# Patient Record
Sex: MALE | Race: WHITE | NOT HISPANIC OR LATINO | Employment: FULL TIME | URBAN - METROPOLITAN AREA
[De-identification: names, ages, dates, MRNs, and addresses within clinical notes are randomized per-mention and may not be internally consistent; named-entity substitution may affect disease eponyms.]

---

## 2017-09-14 ENCOUNTER — ALLIED HEALTH/NURSE VISIT (OUTPATIENT)
Dept: FAMILY MEDICINE | Facility: CLINIC | Age: 47
End: 2017-09-14
Payer: COMMERCIAL

## 2017-09-14 DIAGNOSIS — T63.441A: Primary | ICD-10-CM

## 2017-09-14 PROCEDURE — 99207 ZZC NO CHARGE NURSE ONLY: CPT

## 2017-09-14 NOTE — MR AVS SNAPSHOT
"              After Visit Summary   9/14/2017    Cyrus Sanford    MRN: [id number]           Patient Information     Date Of Birth          1970        Visit Information        Provider Department      9/14/2017 11:00 AM NL RN TEAM A, [facility]        Today's Diagnoses     Sting, bee    -  1       Follow-ups after your visit        Your next 10 appointments already scheduled     Sep 15, 2017  9:00 AM CDT   SHORT with SUDHA Lo CNP   [facility]    [address] [facility] [address] [phone]              Who to contact     If you have questions or need follow up information about today's clinic visit or your schedule please contact [facility] directly at [phone].  Normal or non-critical lab and imaging results will be communicated to you by MyChart, letter or phone within 4 business days after the clinic has received the results. If you do not hear from us within 7 days, please contact the clinic through MyChart or phone. If you have a critical or abnormal lab result, we will notify you by phone as soon as possible.  Submit refill requests through Sentilla or call your pharmacy and they will forward the refill request to us. Please allow 3 business days for your refill to be completed.          Additional Information About Your Visit        MyChart Information     Sentilla lets you send messages to your doctor, view your test results, renew your prescriptions, schedule appointments and more. To sign up, go to [email or web address] . Click on \"Log in\" on the left side of the screen, which will take you to the Welcome page. Then click on \"Sign up Now\" on the right side of the page.     You will be asked to enter the access code listed below, as well as some personal information. Please follow the directions to create your username and password.     Your access code is: [email or web address]  Expires: 12/13/2017 11:42 " AM     Your access code will  in 90 days. If you need help or a new code, please call your [place] clinic or [phone].        Care EveryWhere ID     This is your Care EveryWhere ID. This could be used by other organizations to access your [place] medical records  [id number]         Blood Pressure from Last 3 Encounters:   16 (!) 139/100   16 137/80   04/18/15 128/90    Weight from Last 3 Encounters:   16 195 lb (88.5 kg)   03/26/15 198 lb (89.8 kg)   02/05/15 199 lb (90.3 kg)              Today, you had the following     No orders found for display       Primary Care Provider Office Phone # Fax #    Nicole Hankins PA-C [phone] [address] [place] [address]        Equal Access to Services     [facility]: Hadii aad ku hadasho Soomaali, waaxda luqadaha, qaybta kaalmada adeegyada, waxay abyin hayaan franchesca baldwin . So [facility] [phone].    ATENCIÓN: Si habla español, tiene a fofana disposición servicios gratuitos de asistencia lingüística. Llame al [phone].    We comply with applicable federal civil rights laws and [place] laws. We do not discriminate on the basis of race, color, national origin, age, disability sex, sexual orientation or gender identity.            Thank you!     Thank you for choosing [facility]  for your care. Our goal is always to provide you with excellent care. Hearing back from our patients is one way we can continue to improve our services. Please take a few minutes to complete the written survey that you may receive in the mail after your visit with us. Thank you!             Your Updated Medication List - Protect others around you: Learn how to safely use, store and throw away your medicines at www.disposemymeds.org.          This list is accurate as of: 17 11:42 AM.  Always use your most recent med list.                   Brand Name Dispense Instructions for use Diagnosis     acetaminophen-codeine 300-30 MG per tablet    TYLENOL w/CODEINE No. 3    10 tablet    Take 1 tablet by mouth every 6 hours as needed for mild pain    Influenza A       benzonatate 100 MG capsule    TESSALON    16 capsule    Take 1 capsule (100 mg) by mouth 3 times daily as needed for cough    Influenza A       cetirizine 10 MG tablet    zyrTEC     Take 10 mg by mouth daily        ibuprofen 600 MG tablet    ADVIL/MOTRIN    30 tablet    Take 1 tablet (600 mg) by mouth every 6 hours as needed for moderate pain    Influenza A       oseltamivir 75 MG capsule    TAMIFLU    10 capsule    Take 1 capsule (75 mg) by mouth 2 times daily    Influenza A

## 2017-09-14 NOTE — NURSING NOTE
Patient is here as a walk-in patient for a bee sting next to his right eye 2 days ago.  He has swelling for the past 2 days and has been icing the area, but it is not getting better.  He thinks the stinger is still in the skin which is causing the continual swelling.  He tried to take it out by using a piece of tape, but it just ripped the skin off, so now there is redness in addition to the swelling.  [facility], but they sent him to the clinic.      He is given an ice pack.  RN walked around the clinic asking if anyone would be able to see patient.  All providers are behind and cannot see him today.  He is informed he can either go to the ED today, or be scheduled in [place] tomorrow with TRISHA Do as she was contacted and stated he can be seen in any acute spot.  He is scheduled tomorrow in [place].  Closing this encounter.  Lisa Banks RN

## 2017-09-15 ENCOUNTER — OFFICE VISIT (OUTPATIENT)
Dept: FAMILY MEDICINE | Facility: OTHER | Age: 47
End: 2017-09-15
Payer: COMMERCIAL

## 2017-09-15 VITALS
RESPIRATION RATE: 20 BRPM | OXYGEN SATURATION: 99 % | HEIGHT: 77 IN | WEIGHT: 191 LBS | DIASTOLIC BLOOD PRESSURE: 88 MMHG | TEMPERATURE: 97 F | SYSTOLIC BLOOD PRESSURE: 136 MMHG | BODY MASS INDEX: 22.55 KG/M2 | HEART RATE: 85 BPM

## 2017-09-15 DIAGNOSIS — T63.441A BEE STING REACTION, ACCIDENTAL OR UNINTENTIONAL, INITIAL ENCOUNTER: Primary | ICD-10-CM

## 2017-09-15 DIAGNOSIS — R03.0 ELEVATED BLOOD PRESSURE READING WITHOUT DIAGNOSIS OF HYPERTENSION: ICD-10-CM

## 2017-09-15 PROCEDURE — 99213 OFFICE O/P EST LOW 20 MIN: CPT | Performed by: NURSE PRACTITIONER

## 2017-09-15 ASSESSMENT — PAIN SCALES - GENERAL: PAINLEVEL: NO PAIN (0)

## 2017-09-15 NOTE — MR AVS SNAPSHOT
"              After Visit Summary   9/15/2017    Cyrus Sanford    MRN: [id number]           Patient Information     Date Of Birth          1970        Visit Information        Provider Department      9/15/2017 9:00 AM Yakelin Do APRN CNP [facility]        Care Instructions    Keep an eye on your blood pressure at home.  It should be less than 140/90.     Take Benadryl if needed.     If this starts to look infected or is draining pus, let me know.               Follow-ups after your visit        Who to contact     If you have questions or need follow up information about today's clinic visit or your schedule please contact [facility] directly at [phone].  Normal or non-critical lab and imaging results will be communicated to you by Paxatahart, letter or phone within 4 business days after the clinic has received the results. If you do not hear from us within 7 days, please contact the clinic through MyChart or phone. If you have a critical or abnormal lab result, we will notify you by phone as soon as possible.  Submit refill requests through orderTalk or call your pharmacy and they will forward the refill request to us. Please allow 3 business days for your refill to be completed.          Additional Information About Your Visit        MyChart Information     orderTalk lets you send messages to your doctor, view your test results, renew your prescriptions, schedule appointments and more. To sign up, go to [email or web address] . Click on \"Log in\" on the left side of the screen, which will take you to the Welcome page. Then click on \"Sign up Now\" on the right side of the page.     You will be asked to enter the access code listed below, as well as some personal information. Please follow the directions to create your username and password.     Your access code is: [email or web address]  Expires: 2017 11:42 AM     Your access code will  in 90 days. If you need help or a new " "code, please call your [place] clinic or [phone].        Care EveryWhere ID     This is your Care EveryWhere ID. This could be used by other organizations to access your [place] medical records  [id number]        Your Vitals Were     Pulse Temperature Respirations Height Pulse Oximetry BMI (Body Mass Index)    85 97  F (36.1  C) (Tympanic) 20 6' 4.5\" (1.943 m) 99% 22.95 kg/m2       Blood Pressure from Last 3 Encounters:   09/15/17 (!) 138/96   03/09/16 (!) 139/100   03/04/16 137/80    Weight from Last 3 Encounters:   09/15/17 191 lb (86.6 kg)   03/09/16 195 lb (88.5 kg)   03/26/15 198 lb (89.8 kg)              Today, you had the following     No orders found for display       Primary Care Provider Office Phone # Fax #    Nicole Hankins PA-C [phone]       [address] [place] [address]        Equal Access to Services     [facility]: Hadii aad ku hadasho Soomaali, waaxda luqadaha, qaybta kaalmada adeegyada, waxay chong haydora baldwin . So [facility] [phone].    ATENCIÓN: Si habla español, tiene a fofana disposición servicios gratuitos de asistencia lingüística. Llame al [phone].    We comply with applicable federal civil rights laws and [place] laws. We do not discriminate on the basis of race, color, national origin, age, disability sex, sexual orientation or gender identity.            Thank you!     Thank you for choosing [facility]  for your care. Our goal is always to provide you with excellent care. Hearing back from our patients is one way we can continue to improve our services. Please take a few minutes to complete the written survey that you may receive in the mail after your visit with us. Thank you!             Your Updated Medication List - Protect others around you: Learn how to safely use, store and throw away your medicines at www.disposemymeds.org.      Notice  As of 9/15/2017  9:25 AM    You have not been prescribed any medications. "

## 2017-09-15 NOTE — NURSING NOTE
"Chief Complaint   Patient presents with     Insect Bites     9/12/17 by right eye       Initial BP (!) 138/96  Pulse 85  Temp 97  F (36.1  C) (Tympanic)  Resp 20  Ht 6' 4.5\" (1.943 m)  Wt 191 lb (86.6 kg)  SpO2 99%  BMI 22.95 kg/m2 Estimated body mass index is 22.95 kg/(m^2) as calculated from the following:    Height as of this encounter: 6' 4.5\" (1.943 m).    Weight as of this encounter: 191 lb (86.6 kg).  Medication Reconciliation: complete   ................Lauri Dow LPN,   September 15, 2017,      9:17 AM,   [facility]    "

## 2017-09-15 NOTE — PATIENT INSTRUCTIONS
Keep an eye on your blood pressure at home.  It should be less than 140/90.     Take Benadryl if needed.     If this starts to look infected or is draining pus, let me know.

## 2017-09-15 NOTE — PROGRESS NOTES
"  SUBJECTIVE:   Cyrus Sanford is a 47 year old male who presents to clinic today for the following health issues:      BEE STING  -doi: 9/12/17  -next to right eye  -swelling  -stinger possibly still in skin  -used ice, benadryl, ibuprofen      Problem list and histories reviewed & adjusted, as indicated.  Additional history: as documented    Patient Active Problem List   Diagnosis     Tenosynovitis of finger     CARDIOVASCULAR SCREENING; LDL GOAL LESS THAN 130     Hypertension goal BP (blood pressure) < 140/90     Past Surgical History:   Procedure Laterality Date     HC VASECTOMY UNILAT/BILAT W POSTOP SEMEN  2000    Vasectomy       Social History   Substance Use Topics     Smoking status: Former Smoker     Smokeless tobacco: Current User     Types: Chew      Comment: 1 tin every 3 days     Alcohol use Yes      Comment: occ     Family History   Problem Relation Age of Onset     Respiratory Father      Asthma     CANCER Sister          No current outpatient prescriptions on file.     Allergies   Allergen Reactions     No Known Allergies      BP Readings from Last 3 Encounters:   09/15/17 (!) 138/96   03/09/16 (!) 139/100   03/04/16 137/80    Wt Readings from Last 3 Encounters:   09/15/17 191 lb (86.6 kg)   03/09/16 195 lb (88.5 kg)   03/26/15 198 lb (89.8 kg)                        Reviewed and updated as needed this visit by clinical staffTobacco  Allergies  Meds  Soc Hx      Reviewed and updated as needed this visit by Provider         ROS:  C: NEGATIVE for fever, chills, change in weight  EYES: NEGATIVE for vision changes or irritation, skin irritation from bee sting as above   E/M: NEGATIVE for ear, mouth and throat problems  R: NEGATIVE for significant cough or SOB  CV: NEGATIVE for chest pain, palpitations or peripheral edema    OBJECTIVE:     /88  Pulse 85  Temp 97  F (36.1  C) (Tympanic)  Resp 20  Ht 6' 4.5\" (1.943 m)  Wt 191 lb (86.6 kg)  SpO2 99%  BMI 22.95 kg/m2  Body mass index is 22.95 " kg/(m^2).  GENERAL: healthy, alert and no distress  EYES: Eyes grossly normal to inspection, PERRL and conjunctivae and sclerae normal  HENT: nose and mouth without ulcers or lesions, oropharynx clear and he has skin erythema near his right eye.  There is a an area of darkness in the center that he was questioning if this is the bee stinger.  It is normal to palpation and I cannot express any foreign objects out of this. I suspect is it the incision where the bee stung him, but the stinger does not appear to still be in place.  There is no swelling, no drainage and his eye is not affected.      Diagnostic Test Results:  none     ASSESSMENT/PLAN:         1. Bee sting reaction, accidental or unintentional, initial encounter  Advised he monitor this, take Benadryl as needed.  If signs of infection develop, contact the clinic.     2. Elevated blood pressure reading without diagnosis of hypertension  He has been monitoring this at home.  Advised he continue to do so and that if it is consistently above 140/90, he needs clinic follow up.       See Patient Instructions  Follow up SUDHA Lawson CNP  [facility]

## 2018-07-23 ENCOUNTER — APPOINTMENT (OUTPATIENT)
Dept: GENERAL RADIOLOGY | Facility: CLINIC | Age: 48
End: 2018-07-23
Attending: NURSE PRACTITIONER
Payer: COMMERCIAL

## 2018-07-23 ENCOUNTER — HOSPITAL ENCOUNTER (EMERGENCY)
Facility: CLINIC | Age: 48
Discharge: HOME | End: 2018-07-23
Attending: NURSE PRACTITIONER | Admitting: NURSE PRACTITIONER
Payer: COMMERCIAL

## 2018-07-23 VITALS
WEIGHT: 200 LBS | HEART RATE: 75 BPM | BODY MASS INDEX: 24.03 KG/M2 | RESPIRATION RATE: 16 BRPM | TEMPERATURE: 98.3 F | SYSTOLIC BLOOD PRESSURE: 149 MMHG | OXYGEN SATURATION: 100 % | DIASTOLIC BLOOD PRESSURE: 105 MMHG

## 2018-07-23 DIAGNOSIS — S20.212A CONTUSION OF RIB ON LEFT SIDE, INITIAL ENCOUNTER: ICD-10-CM

## 2018-07-23 DIAGNOSIS — S40.021A CONTUSION OF RIGHT UPPER EXTREMITY, INITIAL ENCOUNTER: ICD-10-CM

## 2018-07-23 PROCEDURE — 25000132 ZZH RX MED GY IP 250 OP 250 PS 637: Performed by: NURSE PRACTITIONER

## 2018-07-23 PROCEDURE — 99284 EMERGENCY DEPT VISIT MOD MDM: CPT | Mod: Z6 | Performed by: NURSE PRACTITIONER

## 2018-07-23 PROCEDURE — 71101 X-RAY EXAM UNILAT RIBS/CHEST: CPT | Mod: TC,LT

## 2018-07-23 PROCEDURE — 99283 EMERGENCY DEPT VISIT LOW MDM: CPT | Performed by: NURSE PRACTITIONER

## 2018-07-23 RX ORDER — IBUPROFEN 600 MG/1
600 TABLET, FILM COATED ORAL ONCE
Status: COMPLETED | OUTPATIENT
Start: 2018-07-23 | End: 2018-07-23

## 2018-07-23 RX ADMIN — IBUPROFEN 600 MG: 600 TABLET ORAL at 17:18

## 2018-07-23 ASSESSMENT — ENCOUNTER SYMPTOMS: COUGH: 1

## 2018-07-23 NOTE — ED AVS SNAPSHOT
[facility]    [address] [place] [address]    Phone:  [phone]    Fax:  [phone]                                       Cyrus Sanford   MRN: [id number]    Department:  [facility]   Date of Visit:  7/23/2018           After Visit Summary Signature Page     I have received my discharge instructions, and my questions have been answered. I have discussed any challenges I see with this plan with the nurse or doctor.    ..........................................................................................................................................  Patient/Patient Representative Signature      ..........................................................................................................................................  Patient Representative Print Name and Relationship to Patient    ..................................................               ................................................  Date                                            Time    ..........................................................................................................................................  Reviewed by Signature/Title    ...................................................              ..............................................  Date                                                            Time

## 2018-07-23 NOTE — ED AVS SNAPSHOT
[facility]    [address] [place] [address]    Phone:  [phone]    Fax:  [phone]                                       Cyrus Sanford   MRN: [id number]    Department:  [facility]   Date of Visit:  7/23/2018           Patient Information     Date Of Birth          1970        Your diagnoses for this visit were:     Contusion of rib on left side, initial encounter     Contusion of right upper extremity, initial encounter        You were seen by Jerri Ron, SUDHA CNP.      Follow-up Information     Follow up with Nicole Hankins PA-C.    Specialty:  Family Practice    Why:  As needed    Contact information:    [address] [place] [address] [phone]          Follow up with [facility].    Specialty:  EMERGENCY MEDICINE    Why:  If symptoms worsen    Contact information:    [address] [place] [address] [phone]    Additional information:    From [address]: Exit at [address] on south side of [place]. Turn right on [address]. Turn left at stoplight on [address]. [facility] will be in view two blocks ahead        Discharge Instructions         Using an Incentive Spirometer    An incentive spirometer is a device that helps you do deep breathing exercises. These exercises expand your lungs, aid in circulation, and help prevent pneumonia. Deep breathing exercises also help you breathe better and improve the function of your lungs by:    Keeping your lungs clear    Strengthening your breathing muscles    Helping prevent respiratory complications or problems  The incentive spirometer gives you a way to take an active part in your recovery. A nurse or therapist will teach you breathing exercises. To do these exercises, you will breathe in through your mouth and not your nose. The incentive spirometer only works correctly if you breathe in  through your mouth.  Steps to clear lungs  Step 1. Exhale normally. Then, inhale normally.    Relax and breathe out.  Step 2. Place your lips tightly around the mouthpiece.    Make sure the device is upright and not tilted.  Step 3. Inhale as much air as you can through the mouthpiece (don't breathe through your nose).    Inhale slowly and deeply.    Hold your breath long enough to keep the balls or disk raised for at least 3 to 5 seconds, or as instructed by your healthcare provider.    Some spirometers have an indicator to let you know that you are breathing in too fast. If the indicator goes off, breathe in more slowly.  Step 4. Repeat the exercise regularly.    Do this exercise every hour while you're awake, or as instructed by your healthcare provider.    If you were taught deep breathing and coughing exercises, do them regularly as instructed by your healthcare provider.   Follow-up care  Make a follow up appointment, or as directed. Also, follow up with your healthcare provider as advised or if your symptoms don't improve or continue to get worse.  When to call your healthcare provider  Call your healthcare provider right away if you have any of the following:    Fever 100.4  (38 C) or higher, or as directed by your healthcare provider    Brownish, bloody, or smelly sputum (phlegm that you cough up)  Call 911  Call 911 if any of these occur:     Shortness of breath that doesn't get better after taking your medicine    Cool, moist, pale or blue skin    Trouble breathing or swallowing, wheezing    Fainting or loss of consciousness    Feeling of dizziness or weakness, or a sudden drop in blood pressure    Feeling very ill    Lightheadedness    Chest pain or rapid heart rate  Date Last Reviewed: 1/1/2017 2000-2017 [employer]. [address] [place] [address], [place] [address]. All rights reserved. This information is not intended as a substitute for professional medical care. Always follow your  healthcare professional's instructions.        Rib Contusion     A rib contusion is a bruise to one or more rib bones. It may cause pain, tenderness, swelling and a purplish discoloration. There may be a sharp pain while breathing.  You will be assessed for other injuries. You will likely be given pain medicine. Rib contusions heal on their own, without further treatment. However, pain may take weeks to months to go away.   Note that a small crack (fracture) in the rib may cause the same symptoms as a rib contusion. The small crack may not be seen on a chest X-ray. However, the conditions are managed in the same way.  Home care    Rest. Avoid heavy lifting, strenuous exertion, or any activity that causes pain.    Ice the area to reduce pain and swelling. Put ice cubes in a plastic bag or use a cold pack. (Wrap the cold source in a thin towel. Do not place it directly on your skin.) Ice the injured area for 20 minutes every 1 to 2 hours the first day. Continue with ice packs 3 to 4 times a day for the next 2 days, then as needed for the relief of pain and swelling.    Take any prescribed pain medicine as directed by your healthcare provider. If none was prescribed, take acetaminophen, ibuprofen, or naproxen to control pain.    If you have a significant injury, you may be given a device called an incentive spirometer to keep your lungs healthy. Use as directed.  Follow-up care  Follow up with your healthcare provider during the next week or as directed.  When to seek medical advice  Call your healthcare provider for any of the following:    Shortness of breath or trouble breathing    Increasing chest pain with breathing    Coughing    Dizziness, weakness, or fainting    New or worsening pain    Fever of 100.4 F (38 C) or higher, or as directed by your healthcare provider  Date Last Reviewed: 2/1/2017 2000-2017 [employer]. [address] [place] [address], [place] [address]. All rights reserved. This  "information is not intended as a substitute for professional medical care. Always follow your healthcare professional's instructions.      Cyrus, you can take 600 mg of Ibuprofen every 6 hours for the next few days.  Take care and I hope you feel better soon.     24 Hour Appointment Hotline       To make an appointment at any [facility], call [phone] ([phone]). If you don't have a family doctor or clinic, we will help you find one. [facility] are conveniently located to serve the needs of you and your family.             Review of your medicines      Notice     You have not been prescribed any medications.            Procedures and tests performed during your visit     Ribs XR, unilat 3 views + PA chest,  left      Orders Needing Specimen Collection     None      Pending Results     No orders found from 7/21/2018 to 7/24/2018.            Pending Culture Results     No orders found from 7/21/2018 to 7/24/2018.            Pending Results Instructions     If you had any lab results that were not finalized at the time of your Discharge, you can call the ED Lab Result RN at [phone]. You will be contacted by this team for any positive Lab results or changes in treatment. The nurses are available 7 days a week from 10A to 6:30P.  You can leave a message 24 hours per day and they will return your call.        Thank you for choosing [facility]       Thank you for choosing [facility] for your care. Our goal is always to provide you with excellent care. Hearing back from our patients is one way we can continue to improve our services. Please take a few minutes to complete the written survey that you may receive in the mail after you visit with us. Thank you!        [employer] Information     [employer] lets you send messages to your doctor, view your test results, renew your prescriptions, schedule appointments and more. To sign up, go to [email or web address] . Click on \"Log in\" on the left side of the " "screen, which will take you to the Welcome page. Then click on \"Sign up Now\" on the right side of the page.     You will be asked to enter the access code listed below, as well as some personal information. Please follow the directions to create your username and password.     Your access code is: [email or web address]  Expires: 10/21/2018  6:10 PM     Your access code will  in 90 days. If you need help or a new code, please call your [place] clinic or [phone].        Care EveryWhere ID     This is your Care EveryWhere ID. This could be used by other organizations to access your [place] medical records  [id number]        Equal Access to Services     ALEJA ANGEL : Matthias Wilson, raghavendra hairston, cornelio emerson, devin marquez. So [facility] [phone].    ATENCIÓN: Si habla español, tiene a fofana disposición servicios gratuitos de asistencia lingüística. Llame al [phone].    We comply with applicable federal civil rights laws and [place] laws. We do not discriminate on the basis of race, color, national origin, age, disability, sex, sexual orientation, or gender identity.            After Visit Summary       This is your record. Keep this with you and show to your community pharmacist(s) and doctor(s) at your next visit.                  "
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
## 2018-07-23 NOTE — LETTER
July 23, 2018      To Whom It May Concern:      Cyrus Sanford was seen in our Emergency Department today, 07/23/18.  Please  Excuse him form work until Thursday.    Thank you      Sincerely,        SUDHA Carrera CNP
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
## 2018-07-23 NOTE — DISCHARGE INSTRUCTIONS
Using an Incentive Spirometer    An incentive spirometer is a device that helps you do deep breathing exercises. These exercises expand your lungs, aid in circulation, and help prevent pneumonia. Deep breathing exercises also help you breathe better and improve the function of your lungs by:    Keeping your lungs clear    Strengthening your breathing muscles    Helping prevent respiratory complications or problems  The incentive spirometer gives you a way to take an active part in your recovery. A nurse or therapist will teach you breathing exercises. To do these exercises, you will breathe in through your mouth and not your nose. The incentive spirometer only works correctly if you breathe in through your mouth.  Steps to clear lungs  Step 1. Exhale normally. Then, inhale normally.    Relax and breathe out.  Step 2. Place your lips tightly around the mouthpiece.    Make sure the device is upright and not tilted.  Step 3. Inhale as much air as you can through the mouthpiece (don't breathe through your nose).    Inhale slowly and deeply.    Hold your breath long enough to keep the balls or disk raised for at least 3 to 5 seconds, or as instructed by your healthcare provider.    Some spirometers have an indicator to let you know that you are breathing in too fast. If the indicator goes off, breathe in more slowly.  Step 4. Repeat the exercise regularly.    Do this exercise every hour while you're awake, or as instructed by your healthcare provider.    If you were taught deep breathing and coughing exercises, do them regularly as instructed by your healthcare provider.   Follow-up care  Make a follow up appointment, or as directed. Also, follow up with your healthcare provider as advised or if your symptoms don't improve or continue to get worse.  When to call your healthcare provider  Call your healthcare provider right away if you have any of the following:    Fever 100.4  (38 C) or higher, or as directed by your  healthcare provider    Brownish, bloody, or smelly sputum (phlegm that you cough up)  Call 911  Call 911 if any of these occur:     Shortness of breath that doesn't get better after taking your medicine    Cool, moist, pale or blue skin    Trouble breathing or swallowing, wheezing    Fainting or loss of consciousness    Feeling of dizziness or weakness, or a sudden drop in blood pressure    Feeling very ill    Lightheadedness    Chest pain or rapid heart rate  Date Last Reviewed: 1/1/2017 2000-2017 [employer]. [address] [place] [address]. All rights reserved. This information is not intended as a substitute for professional medical care. Always follow your healthcare professional's instructions.        Rib Contusion     A rib contusion is a bruise to one or more rib bones. It may cause pain, tenderness, swelling and a purplish discoloration. There may be a sharp pain while breathing.  You will be assessed for other injuries. You will likely be given pain medicine. Rib contusions heal on their own, without further treatment. However, pain may take weeks to months to go away.   Note that a small crack (fracture) in the rib may cause the same symptoms as a rib contusion. The small crack may not be seen on a chest X-ray. However, the conditions are managed in the same way.  Home care    Rest. Avoid heavy lifting, strenuous exertion, or any activity that causes pain.    Ice the area to reduce pain and swelling. Put ice cubes in a plastic bag or use a cold pack. (Wrap the cold source in a thin towel. Do not place it directly on your skin.) Ice the injured area for 20 minutes every 1 to 2 hours the first day. Continue with ice packs 3 to 4 times a day for the next 2 days, then as needed for the relief of pain and swelling.    Take any prescribed pain medicine as directed by your healthcare provider. If none was prescribed, take acetaminophen, ibuprofen, or naproxen to control pain.    If you  have a significant injury, you may be given a device called an incentive spirometer to keep your lungs healthy. Use as directed.  Follow-up care  Follow up with your healthcare provider during the next week or as directed.  When to seek medical advice  Call your healthcare provider for any of the following:    Shortness of breath or trouble breathing    Increasing chest pain with breathing    Coughing    Dizziness, weakness, or fainting    New or worsening pain    Fever of 100.4 F (38 C) or higher, or as directed by your healthcare provider  Date Last Reviewed: 2/1/2017 2000-2017 [employer]. [address] [place] [address]. All rights reserved. This information is not intended as a substitute for professional medical care. Always follow your healthcare professional's instructions.      Cyrus, you can take 600 mg of Ibuprofen every 6 hours for the next few days.  Take care and I hope you feel better soon.

## 2018-11-05 ENCOUNTER — OFFICE VISIT (OUTPATIENT)
Dept: FAMILY MEDICINE | Facility: CLINIC | Age: 48
End: 2018-11-05
Payer: COMMERCIAL

## 2018-11-05 VITALS
TEMPERATURE: 97.9 F | DIASTOLIC BLOOD PRESSURE: 86 MMHG | WEIGHT: 198.9 LBS | HEIGHT: 77 IN | SYSTOLIC BLOOD PRESSURE: 152 MMHG | OXYGEN SATURATION: 99 % | HEART RATE: 105 BPM | RESPIRATION RATE: 18 BRPM | BODY MASS INDEX: 23.49 KG/M2

## 2018-11-05 DIAGNOSIS — Z13.6 CARDIOVASCULAR SCREENING; LDL GOAL LESS THAN 130: ICD-10-CM

## 2018-11-05 DIAGNOSIS — Z12.11 COLON CANCER SCREENING: ICD-10-CM

## 2018-11-05 DIAGNOSIS — E78.5 HYPERLIPIDEMIA LDL GOAL <130: ICD-10-CM

## 2018-11-05 DIAGNOSIS — I10 HYPERTENSION GOAL BP (BLOOD PRESSURE) < 140/90: ICD-10-CM

## 2018-11-05 DIAGNOSIS — Z12.5 SCREENING FOR PROSTATE CANCER: ICD-10-CM

## 2018-11-05 DIAGNOSIS — R68.82 DECREASED LIBIDO: ICD-10-CM

## 2018-11-05 DIAGNOSIS — Z00.01 ENCOUNTER FOR GENERAL ADULT MEDICAL EXAMINATION WITH ABNORMAL FINDINGS: Primary | ICD-10-CM

## 2018-11-05 PROCEDURE — 99396 PREV VISIT EST AGE 40-64: CPT | Performed by: FAMILY MEDICINE

## 2018-11-05 PROCEDURE — 99213 OFFICE O/P EST LOW 20 MIN: CPT | Mod: 25 | Performed by: FAMILY MEDICINE

## 2018-11-05 RX ORDER — AMLODIPINE BESYLATE 5 MG/1
5 TABLET ORAL DAILY
Qty: 30 TABLET | Refills: 1 | Status: SHIPPED | OUTPATIENT
Start: 2018-11-05 | End: 2019-01-03

## 2018-11-05 ASSESSMENT — ENCOUNTER SYMPTOMS
COUGH: 0
FREQUENCY: 1
HEARTBURN: 0
HEMATURIA: 0
EYE PAIN: 0
NAUSEA: 0
HEADACHES: 0
PARESTHESIAS: 0
JOINT SWELLING: 0
WEAKNESS: 0
PALPITATIONS: 0
DYSURIA: 0
FEVER: 0
SHORTNESS OF BREATH: 0
CHILLS: 0
HEMATOCHEZIA: 0
ABDOMINAL PAIN: 0
ARTHRALGIAS: 0
MYALGIAS: 1
CONSTIPATION: 0
DIARRHEA: 0
NERVOUS/ANXIOUS: 0
SORE THROAT: 0
DIZZINESS: 0

## 2018-11-05 ASSESSMENT — PAIN SCALES - GENERAL: PAINLEVEL: NO PAIN (0)

## 2018-11-05 NOTE — PATIENT INSTRUCTIONS
Preventive Health Recommendations  Male Ages 50 - 64    Yearly exam:             See your health care provider every year in order to  o   Review health changes.   o   Discuss preventive care.    o   Review your medicines if your doctor has prescribed any.     Have a cholesterol test every 5 years, or more frequently if you are at risk for high cholesterol/heart disease.     Have a diabetes test (fasting glucose) every three years. If you are at risk for diabetes, you should have this test more often.     Have a colonoscopy at age 50, or have a yearly FIT test (stool test). These exams will check for colon cancer.      Talk with your health care provider about whether or not a prostate cancer screening test (PSA) is right for you.    You should be tested each year for STDs (sexually transmitted diseases), if you re at risk.     Shots: Get a flu shot each year. Get a tetanus shot every 10 years.     Nutrition:    Eat at least 5 servings of fruits and vegetables daily.     Eat whole-grain bread, whole-wheat pasta and brown rice instead of white grains and rice.     Get adequate Calcium and Vitamin D.     Lifestyle    Exercise for at least 150 minutes a week (30 minutes a day, 5 days a week). This will help you control your weight and prevent disease.     Limit alcohol to one drink per day.     No smoking.     Wear sunscreen to prevent skin cancer.     See your dentist every six months for an exam and cleaning.     See your eye doctor every 1 to 2 years.  Start amlodipine 5 mg a day for blood pressure

## 2018-11-05 NOTE — PROGRESS NOTES
SUBJECTIVE:   CC: Cyrus Sanford is an 48 year old male who presents for preventative health visit.     Physical   Annual:     Getting at least 3 servings of Calcium per day:  Yes    Bi-annual eye exam:  Yes    Dental care twice a year:  NO    Sleep apnea or symptoms of sleep apnea:  None    Diet:  Regular (no restrictions)    Frequency of exercise:  1 day/week    Duration of exercise:  45-60 minutes    Taking medications regularly:  Yes    Medication side effects:  Not applicable    Additional concerns today:  Yes        Complains of decreased libido.    Today's PHQ-2 Score:   PHQ-2 ( 1999 [employer]) 11/5/2018   Q1: Little interest or pleasure in doing things 0   Q2: Feeling down, depressed or hopeless 0   PHQ-2 Score 0   Q1: Little interest or pleasure in doing things Not at all   Q2: Feeling down, depressed or hopeless Not at all   PHQ-2 Score 0       Abuse: Current or Past(Physical, Sexual or Emotional)- No  Do you feel safe in your environment - Yes    Social History   Substance Use Topics     Smoking status: Former Smoker     Smokeless tobacco: Current User     Types: Chew      Comment: 1 tin every 3 days     Alcohol use Yes      Comment: occ     Alcohol Use 11/5/2018   If you drink alcohol do you typically have greater than 3 drinks per day OR greater than 7 drinks per week? Yes       Last PSA: No results found for: PSA    Reviewed orders with patient. Reviewed health maintenance and updated orders accordingly - Yes      Reviewed and updated as needed this visit by clinical staff  Tobacco  Allergies  Meds         Reviewed and updated as needed this visit by Provider        Past Medical History:   Diagnosis Date     Unspecified asthma(493.90)     Asthma      Past Surgical History:   Procedure Laterality Date     HC VASECTOMY UNILAT/BILAT W POSTOP SEMEN  2000    Vasectomy       Review of Systems   Constitutional: Negative for chills and fever.   HENT: Positive for hearing loss. Negative for congestion, ear pain  "and sore throat.    Eyes: Positive for visual disturbance. Negative for pain.   Respiratory: Negative for cough and shortness of breath.    Cardiovascular: Negative for chest pain, palpitations and peripheral edema.   Gastrointestinal: Negative for abdominal pain, constipation, diarrhea, heartburn, hematochezia and nausea.   Genitourinary: Positive for frequency and urgency. Negative for discharge, dysuria, genital sores, hematuria and impotence.   Musculoskeletal: Positive for myalgias. Negative for arthralgias and joint swelling.   Skin: Negative for rash.   Neurological: Negative for dizziness, weakness, headaches and paresthesias.   Psychiatric/Behavioral: Negative for mood changes. The patient is not nervous/anxious.          OBJECTIVE:   /86  Pulse 105  Temp 97.9  F (36.6  C) (Temporal)  Resp 18  Ht 6' 5.25\" (1.962 m)  Wt 198 lb 14.4 oz (90.2 kg)  SpO2 99%  BMI 23.43 kg/m2    Physical Exam  GENERAL: healthy, alert and no distress  EYES: Eyes grossly normal to inspection, PERRL and conjunctivae and sclerae normal  HENT: ear canals and TM's normal, nose and mouth without ulcers or lesions  NECK: no adenopathy, no asymmetry, masses, or scars and thyroid normal to palpation  RESP: lungs clear to auscultation - no rales, rhonchi or wheezes  CV: regular rate and rhythm, normal S1 S2, no S3 or S4, no murmur, click or rub, no peripheral edema and peripheral pulses strong  ABDOMEN: soft, nontender, no hepatosplenomegaly, no masses and bowel sounds normal  MS: no gross musculoskeletal defects noted, no edema  SKIN: no suspicious lesions or rashes  NEURO: Normal strength and tone, mentation intact and speech normal  PSYCH: mentation appears normal, affect normal/bright    Diagnostic Test Results:  none     ASSESSMENT/PLAN:   #1 encounter for general medical examination with abnormal findings    #2 hypertension newly discovered we will start amlodipine 5 mg a day follow-up in a month.      #3 hyperlipidemia   " "he has cholesterol is very high we will notify him by phone and recommend starting Lipitor 40 mg a day and rechecking in 6-8 weeks.    #4 decreased libido we will get testosterone level on him and notify with results.  Also a PSA as he has decreased urine flow.    COUNSELING:   Reviewed preventive health counseling, as reflected in patient instructions       Regular exercise       Healthy diet/nutrition    BP Readings from Last 1 Encounters:   11/05/18 152/86     Estimated body mass index is 23.43 kg/(m^2) as calculated from the following:    Height as of this encounter: 6' 5.25\" (1.962 m).    Weight as of this encounter: 198 lb 14.4 oz (90.2 kg).           reports that he has quit smoking. His smokeless tobacco use includes Chew.  Tobacco Cessation Action Plan: Self help information given to patient    Counseling Resources:  ATP IV Guidelines  Pooled Cohorts Equation Calculator  FRAX Risk Assessment  ICSI Preventive Guidelines  Dietary Guidelines for Americans, 2010  USDA's MyPlate  ASA Prophylaxis  Lung CA Screening    Richard Barroso MD  [facility]  Answers for HPI/ROS submitted by the patient on 11/5/2018   PHQ-2 Score: 0    "

## 2018-11-05 NOTE — MR AVS SNAPSHOT
After Visit Summary   11/5/2018    Cyrus Sanford    MRN: [id number]           Patient Information     Date Of Birth          1970        Visit Information        Provider Department      11/5/2018 11:00 AM Richard Barroso MD [facility]        Today's Diagnoses     Encounter for general adult medical examination with abnormal findings    -  1    Hypertension goal BP (blood pressure) < 140/90        Colon cancer screening        Decreased libido        CARDIOVASCULAR SCREENING; LDL GOAL LESS THAN 130        Screening for prostate cancer          Care Instructions      Preventive Health Recommendations  Male Ages 50 - 64    Yearly exam:             See your health care provider every year in order to  o   Review health changes.   o   Discuss preventive care.    o   Review your medicines if your doctor has prescribed any.     Have a cholesterol test every 5 years, or more frequently if you are at risk for high cholesterol/heart disease.     Have a diabetes test (fasting glucose) every three years. If you are at risk for diabetes, you should have this test more often.     Have a colonoscopy at age 50, or have a yearly FIT test (stool test). These exams will check for colon cancer.      Talk with your health care provider about whether or not a prostate cancer screening test (PSA) is right for you.    You should be tested each year for STDs (sexually transmitted diseases), if you re at risk.     Shots: Get a flu shot each year. Get a tetanus shot every 10 years.     Nutrition:    Eat at least 5 servings of fruits and vegetables daily.     Eat whole-grain bread, whole-wheat pasta and brown rice instead of white grains and rice.     Get adequate Calcium and Vitamin D.     Lifestyle    Exercise for at least 150 minutes a week (30 minutes a day, 5 days a week). This will help you control your weight and prevent disease.     Limit alcohol to one drink per day.     No smoking.     Wear  sunscreen to prevent skin cancer.     See your dentist every six months for an exam and cleaning.     See your eye doctor every 1 to 2 years.  Start amlodipine 5 mg a day for blood pressure          Follow-ups after your visit        Additional Services     GASTROENTEROLOGY ADULT REF PROCEDURE ONLY [facility] [phone]       Last Lab Result: Creatinine (mg/dL)       Date                     Value                 03/09/2016               1.25             ----------  Body mass index is 23.43 kg/(m^2).     Needed:  No  Language:  English    Patient will be contacted to schedule procedure.     Please be aware that coverage of these services is subject to the terms and limitations of your health insurance plan.  Call member services at your health plan with any benefit or coverage questions.  Any procedures must be performed at a [facility] facility OR coordinated by your clinic's referral office.    Please bring the following with you to your appointment:    (1) Any X-Rays, CTs or MRIs which have been performed.  Contact the facility where they were done to arrange for  prior to your scheduled appointment.    (2) List of current medications   (3) This referral request   (4) Any documents/labs given to you for this referral                  Follow-up notes from your care team     Call patient with results Return in about 4 weeks (around 12/3/2018) for BP Recheck.      Future tests that were ordered for you today     Open Future Orders        Priority Expected Expires Ordered    Lipid panel reflex to direct LDL Fasting Routine 1/4/2019 3/5/2019 11/5/2018    **Comprehensive metabolic panel FUTURE 2mo Routine 1/4/2019 3/5/2019 11/5/2018    **CBC with platelets FUTURE 2mo Routine 1/4/2019 3/5/2019 11/5/2018    **Testosterone Free and Total FUTURE anytime Routine 11/5/2018 11/5/2019 11/5/2018    Prostate spec antigen screen Routine  11/5/2019 11/5/2018            Who to contact     If you have  "questions or need follow up information about today's clinic visit or your schedule please contact [facility] directly at [phone].  Normal or non-critical lab and imaging results will be communicated to you by MyChart, letter or phone within 4 business days after the clinic has received the results. If you do not hear from us within 7 days, please contact the clinic through MyChart or phone. If you have a critical or abnormal lab result, we will notify you by phone as soon as possible.  Submit refill requests through [employer] or call your pharmacy and they will forward the refill request to us. Please allow 3 business days for your refill to be completed.          Additional Information About Your Visit        Care EveryWhere ID     This is your Care EveryWhere ID. This could be used by other organizations to access your [place] medical records  [id number]        Your Vitals Were     Pulse Temperature Respirations Height Pulse Oximetry BMI (Body Mass Index)    105 97.9  F (36.6  C) (Temporal) 18 6' 5.25\" (1.962 m) 99% 23.43 kg/m2       Blood Pressure from Last 3 Encounters:   11/05/18 152/86   07/23/18 (!) 149/105   09/15/17 136/88    Weight from Last 3 Encounters:   11/05/18 198 lb 14.4 oz (90.2 kg)   07/23/18 200 lb (90.7 kg)   09/15/17 191 lb (86.6 kg)              We Performed the Following     GASTROENTEROLOGY ADULT REF PROCEDURE ONLY [facility] [phone]          Today's Medication Changes          These changes are accurate as of 11/5/18 11:50 AM.  If you have any questions, ask your nurse or doctor.               Start taking these medicines.        Dose/Directions    amLODIPine 5 MG tablet   Commonly known as:  NORVASC   Used for:  Hypertension goal BP (blood pressure) < 140/90   Started by:  Richard Barroso MD        Dose:  5 mg   Take 1 tablet (5 mg) by mouth daily   Quantity:  30 tablet   Refills:  1            Where to get your medicines      These medications were " sent to [facility] [address] [place], [place] - [address]  [address], [place] [facility] [address]     Phone:  [phone]     amLODIPine 5 MG tablet                Primary Care Provider Fax #    Physician No Ref-Primary [phone]       No address on file        Equal Access to Services     BRANDIEGUERDA JEANNE : Hadii aad ku hadasho Soomaali, waaxda luqadaha, qaybta kaalmada adeegyada, waxay abyin hayirman adetyrell jovanamerlin larebecca marquez. So [facility] [phone].    ATENCIÓN: Si habla español, tiene a fofana disposición servicios gratuitos de asistencia lingüística. Lissetteame al [phone].    We comply with applicable federal civil rights laws and [place] laws. We do not discriminate on the basis of race, color, national origin, age, disability, sex, sexual orientation, or gender identity.            Thank you!     Thank you for choosing [facility]  for your care. Our goal is always to provide you with excellent care. Hearing back from our patients is one way we can continue to improve our services. Please take a few minutes to complete the written survey that you may receive in the mail after your visit with us. Thank you!             Your Updated Medication List - Protect others around you: Learn how to safely use, store and throw away your medicines at www.disposemymeds.org.          This list is accurate as of 11/5/18 11:50 AM.  Always use your most recent med list.                   Brand Name Dispense Instructions for use Diagnosis    amLODIPine 5 MG tablet    NORVASC    30 tablet    Take 1 tablet (5 mg) by mouth daily    Hypertension goal BP (blood pressure) < 140/90       IBUPROFEN PO      4 tabs daily

## 2018-11-06 ENCOUNTER — TELEPHONE (OUTPATIENT)
Dept: FAMILY MEDICINE | Facility: CLINIC | Age: 48
End: 2018-11-06

## 2018-11-06 NOTE — LETTER
[address] [address]  [phone]        November 6, 2018    Cyrus Sanford  [address] [address] [place] [id number]

## 2018-11-06 NOTE — LETTER

## 2018-11-06 NOTE — TELEPHONE ENCOUNTER
Date of colonoscopy/EGD: 12/10/18  Surgeon: Dr. Montgomery  Prep:Miralax  Packet:Colonoscopy/EGD instructions mailed to patient's home address.   Date: 11/6/2018      Surgery Scheduler

## 2018-11-07 DIAGNOSIS — Z13.6 CARDIOVASCULAR SCREENING; LDL GOAL LESS THAN 130: ICD-10-CM

## 2018-11-07 DIAGNOSIS — R68.82 DECREASED LIBIDO: ICD-10-CM

## 2018-11-07 DIAGNOSIS — I10 HYPERTENSION GOAL BP (BLOOD PRESSURE) < 140/90: ICD-10-CM

## 2018-11-07 DIAGNOSIS — Z12.5 SCREENING FOR PROSTATE CANCER: ICD-10-CM

## 2018-11-07 LAB
ALBUMIN SERPL-MCNC: 3.8 G/DL (ref 3.4–5)
ALP SERPL-CCNC: 64 U/L (ref 40–150)
ALT SERPL W P-5'-P-CCNC: 32 U/L (ref 0–70)
ANION GAP SERPL CALCULATED.3IONS-SCNC: 9 MMOL/L (ref 3–14)
AST SERPL W P-5'-P-CCNC: 18 U/L (ref 0–45)
BILIRUB SERPL-MCNC: 0.9 MG/DL (ref 0.2–1.3)
BUN SERPL-MCNC: 16 MG/DL (ref 7–30)
CALCIUM SERPL-MCNC: 9 MG/DL (ref 8.5–10.1)
CHLORIDE SERPL-SCNC: 105 MMOL/L (ref 94–109)
CHOLEST SERPL-MCNC: 251 MG/DL
CO2 SERPL-SCNC: 27 MMOL/L (ref 20–32)
CREAT SERPL-MCNC: 1.19 MG/DL (ref 0.66–1.25)
ERYTHROCYTE [DISTWIDTH] IN BLOOD BY AUTOMATED COUNT: 13.7 % (ref 10–15)
GFR SERPL CREATININE-BSD FRML MDRD: 65 ML/MIN/1.7M2
GLUCOSE SERPL-MCNC: 101 MG/DL (ref 70–99)
HCT VFR BLD AUTO: 47.9 % (ref 40–53)
HDLC SERPL-MCNC: 72 MG/DL
HGB BLD-MCNC: 15.5 G/DL (ref 13.3–17.7)
LDLC SERPL CALC-MCNC: 158 MG/DL
MCH RBC QN AUTO: 28 PG (ref 26.5–33)
MCHC RBC AUTO-ENTMCNC: 32.4 G/DL (ref 31.5–36.5)
MCV RBC AUTO: 87 FL (ref 78–100)
NONHDLC SERPL-MCNC: 179 MG/DL
PLATELET # BLD AUTO: 252 10E9/L (ref 150–450)
POTASSIUM SERPL-SCNC: 4 MMOL/L (ref 3.4–5.3)
PROT SERPL-MCNC: 7.6 G/DL (ref 6.8–8.8)
PSA SERPL-ACNC: 0.76 UG/L (ref 0–4)
RBC # BLD AUTO: 5.53 10E12/L (ref 4.4–5.9)
SODIUM SERPL-SCNC: 141 MMOL/L (ref 133–144)
TRIGL SERPL-MCNC: 106 MG/DL
WBC # BLD AUTO: 5.1 10E9/L (ref 4–11)

## 2018-11-07 PROCEDURE — G0103 PSA SCREENING: HCPCS | Performed by: FAMILY MEDICINE

## 2018-11-07 PROCEDURE — 84270 ASSAY OF SEX HORMONE GLOBUL: CPT | Performed by: FAMILY MEDICINE

## 2018-11-07 PROCEDURE — 84403 ASSAY OF TOTAL TESTOSTERONE: CPT | Performed by: FAMILY MEDICINE

## 2018-11-07 PROCEDURE — 85027 COMPLETE CBC AUTOMATED: CPT | Performed by: FAMILY MEDICINE

## 2018-11-07 PROCEDURE — 36415 COLL VENOUS BLD VENIPUNCTURE: CPT | Performed by: FAMILY MEDICINE

## 2018-11-07 PROCEDURE — 80061 LIPID PANEL: CPT | Performed by: FAMILY MEDICINE

## 2018-11-07 PROCEDURE — 80053 COMPREHEN METABOLIC PANEL: CPT | Performed by: FAMILY MEDICINE

## 2018-11-09 LAB
SHBG SERPL-SCNC: 25 NMOL/L (ref 11–80)
TESTOST FREE SERPL-MCNC: 8.88 NG/DL (ref 4.7–24.4)
TESTOST SERPL-MCNC: 378 NG/DL (ref 240–950)

## 2018-11-13 DIAGNOSIS — Z13.6 CARDIOVASCULAR SCREENING; LDL GOAL LESS THAN 130: ICD-10-CM

## 2018-11-13 DIAGNOSIS — I10 HYPERTENSION GOAL BP (BLOOD PRESSURE) < 140/90: Primary | ICD-10-CM

## 2018-11-13 NOTE — PROGRESS NOTES
Labs from last week show your testosterone level was in the normal range.  Your cholesterol is very high at 251 with a bad type of 158.  Chemistry panel including blood sugar was normal and your PSA was normal blood count was normal.  It would be recommended that you start a lipid-lowering medication I would recommend Lipitor 40 mg a day and we will recheck blood work in 6-8 weeks with a lipid panel AST ALT and CK.  We can arrange this when we see you back to check her blood pressure.

## 2018-11-13 NOTE — TELEPHONE ENCOUNTER
----- Message from Richard Barroso MD sent at 11/13/2018 12:23 PM CST -----  Labs from last week show your testosterone level was in the normal range.  Your cholesterol is very high at 251 with a bad type of 158.  Chemistry panel including blood sugar was normal and your PSA was normal blood count was normal.  It would be recommended that you start a lipid-lowering medication I would recommend Lipitor 40 mg a day and we will recheck blood work in 6-8 weeks with a lipid panel AST ALT and CK.  We can arrange this when we see you back to check her blood pressure.

## 2018-11-13 NOTE — TELEPHONE ENCOUNTER
Left message to call back. Please relay results to pt when calls back. Also find out which pharmacy he uses for the medication and assist in scheduling an appt with Dr. Barroso.

## 2018-11-13 NOTE — TELEPHONE ENCOUNTER
Patient calling back, gave message below, patient understands the message. Patient uses the [email or web address] in [place]. Also scheduled patient for a follow up with his provider.

## 2018-11-14 RX ORDER — ATORVASTATIN CALCIUM 40 MG/1
40 TABLET, FILM COATED ORAL DAILY
Qty: 90 TABLET | Refills: 1 | Status: SHIPPED | OUTPATIENT
Start: 2018-11-14 | End: 2019-05-28

## 2018-12-09 ENCOUNTER — ANESTHESIA EVENT (OUTPATIENT)
Dept: GASTROENTEROLOGY | Facility: CLINIC | Age: 48
End: 2018-12-09
Payer: COMMERCIAL

## 2018-12-09 ASSESSMENT — LIFESTYLE VARIABLES: TOBACCO_USE: 1

## 2018-12-10 ENCOUNTER — SURGERY (OUTPATIENT)
Age: 48
End: 2018-12-10
Payer: COMMERCIAL

## 2018-12-10 ENCOUNTER — ANESTHESIA (OUTPATIENT)
Dept: GASTROENTEROLOGY | Facility: CLINIC | Age: 48
End: 2018-12-10
Payer: COMMERCIAL

## 2018-12-10 ENCOUNTER — HOSPITAL ENCOUNTER (OUTPATIENT)
Facility: CLINIC | Age: 48
Discharge: HOME | End: 2018-12-10
Attending: FAMILY MEDICINE | Admitting: FAMILY MEDICINE
Payer: COMMERCIAL

## 2018-12-10 VITALS
TEMPERATURE: 97.5 F | RESPIRATION RATE: 16 BRPM | OXYGEN SATURATION: 100 % | DIASTOLIC BLOOD PRESSURE: 105 MMHG | SYSTOLIC BLOOD PRESSURE: 135 MMHG

## 2018-12-10 LAB — COLONOSCOPY: NORMAL

## 2018-12-10 PROCEDURE — 37000008 ZZH ANESTHESIA TECHNICAL FEE, 1ST 30 MIN: Performed by: FAMILY MEDICINE

## 2018-12-10 PROCEDURE — 40000299 ZZH STATISTIC ENDO RECOVERY CLASS 1:3 EA ADD HOUR: Performed by: FAMILY MEDICINE

## 2018-12-10 PROCEDURE — G0121 COLON CA SCRN NOT HI RSK IND: HCPCS | Performed by: FAMILY MEDICINE

## 2018-12-10 PROCEDURE — 25000128 H RX IP 250 OP 636: Performed by: NURSE ANESTHETIST, CERTIFIED REGISTERED

## 2018-12-10 PROCEDURE — 45378 DIAGNOSTIC COLONOSCOPY: CPT | Performed by: FAMILY MEDICINE

## 2018-12-10 PROCEDURE — 25000125 ZZHC RX 250: Performed by: NURSE ANESTHETIST, CERTIFIED REGISTERED

## 2018-12-10 PROCEDURE — 25000125 ZZHC RX 250: Performed by: FAMILY MEDICINE

## 2018-12-10 PROCEDURE — 40000296 ZZH STATISTIC ENDO RECOVERY CLASS 1:2 FIRST HOUR: Performed by: FAMILY MEDICINE

## 2018-12-10 PROCEDURE — 37000009 ZZH ANESTHESIA TECHNICAL FEE, EACH ADDTL 15 MIN: Performed by: FAMILY MEDICINE

## 2018-12-10 PROCEDURE — 25000128 H RX IP 250 OP 636: Performed by: FAMILY MEDICINE

## 2018-12-10 RX ORDER — LIDOCAINE 40 MG/G
CREAM TOPICAL
Status: DISCONTINUED | OUTPATIENT
Start: 2018-12-10 | End: 2018-12-10 | Stop reason: HOSPADM

## 2018-12-10 RX ORDER — PROPOFOL 10 MG/ML
INJECTION, EMULSION INTRAVENOUS PRN
Status: DISCONTINUED | OUTPATIENT
Start: 2018-12-10 | End: 2018-12-10

## 2018-12-10 RX ORDER — LIDOCAINE HYDROCHLORIDE 20 MG/ML
INJECTION, SOLUTION INFILTRATION; PERINEURAL PRN
Status: DISCONTINUED | OUTPATIENT
Start: 2018-12-10 | End: 2018-12-10

## 2018-12-10 RX ORDER — PROPOFOL 10 MG/ML
INJECTION, EMULSION INTRAVENOUS CONTINUOUS PRN
Status: DISCONTINUED | OUTPATIENT
Start: 2018-12-10 | End: 2018-12-10

## 2018-12-10 RX ORDER — ONDANSETRON 2 MG/ML
4 INJECTION INTRAMUSCULAR; INTRAVENOUS
Status: DISCONTINUED | OUTPATIENT
Start: 2018-12-10 | End: 2018-12-10 | Stop reason: HOSPADM

## 2018-12-10 RX ORDER — SODIUM CHLORIDE, SODIUM LACTATE, POTASSIUM CHLORIDE, CALCIUM CHLORIDE 600; 310; 30; 20 MG/100ML; MG/100ML; MG/100ML; MG/100ML
INJECTION, SOLUTION INTRAVENOUS CONTINUOUS
Status: DISCONTINUED | OUTPATIENT
Start: 2018-12-10 | End: 2018-12-10 | Stop reason: HOSPADM

## 2018-12-10 RX ADMIN — PROPOFOL 50 MG: 10 INJECTION, EMULSION INTRAVENOUS at 12:59

## 2018-12-10 RX ADMIN — PROPOFOL 150 MCG/KG/MIN: 10 INJECTION, EMULSION INTRAVENOUS at 12:59

## 2018-12-10 RX ADMIN — LIDOCAINE HYDROCHLORIDE 1 ML: 10 INJECTION, SOLUTION EPIDURAL; INFILTRATION; INTRACAUDAL at 11:16

## 2018-12-10 RX ADMIN — SODIUM CHLORIDE, POTASSIUM CHLORIDE, SODIUM LACTATE AND CALCIUM CHLORIDE: 600; 310; 30; 20 INJECTION, SOLUTION INTRAVENOUS at 11:36

## 2018-12-10 RX ADMIN — PROPOFOL 50 MG: 10 INJECTION, EMULSION INTRAVENOUS at 12:58

## 2018-12-10 RX ADMIN — LIDOCAINE HYDROCHLORIDE 40 MG: 20 INJECTION, SOLUTION INFILTRATION; PERINEURAL at 12:59

## 2018-12-10 RX ADMIN — PROPOFOL 50 MG: 10 INJECTION, EMULSION INTRAVENOUS at 13:00

## 2018-12-10 NOTE — ANESTHESIA PREPROCEDURE EVALUATION
Anesthesia Pre-Procedure Evaluation    Patient: Cyrus Sanford   MRN: [id number] : 1970          Preoperative Diagnosis: screening    Procedure(s):  COLONOSCOPY    Past Medical History:   Diagnosis Date     Unspecified asthma(493.90)     Asthma     Past Surgical History:   Procedure Laterality Date     HC VASECTOMY UNILAT/BILAT W POSTOP SEMEN  2000    Vasectomy       Anesthesia Evaluation     . Pt has had prior anesthetic. Type: MAC           ROS/MED HX    ENT/Pulmonary: Comment: Current smokeless tobacco user    (+)tobacco use, Past use asthma , . .    Neurologic:  - neg neurologic ROS     Cardiovascular:     (+) hypertension-range: 140/90, ---. : . . . :. .       METS/Exercise Tolerance:  >4 METS   Hematologic:  - neg hematologic  ROS       Musculoskeletal:  - neg musculoskeletal ROS       GI/Hepatic:  - neg GI/hepatic ROS       Renal/Genitourinary:         Endo:  - neg endo ROS       Psychiatric:  - neg psychiatric ROS       Infectious Disease:  - neg infectious disease ROS       Malignancy:      - no malignancy   Other:    - neg other ROS                      Physical Exam  Normal systems: cardiovascular, pulmonary and dental    Airway   Mallampati: I  TM distance: >3 FB  Neck ROM: full    Dental     Cardiovascular   Rhythm and rate: regular and normal      Pulmonary    breath sounds clear to auscultation            Lab Results   Component Value Date    WBC 5.1 2018    HGB 15.5 2018    HCT 47.9 2018     2018     2018    POTASSIUM 4.0 2018    CHLORIDE 105 2018    CO2 27 2018    BUN 16 2018    CR 1.19 2018     (H) 2018    ELISE 9.0 2018    ALBUMIN 3.8 2018    PROTTOTAL 7.6 2018    ALT 32 2018    AST 18 2018    ALKPHOS 64 2018    BILITOTAL 0.9 2018    TSH 1.60 2015       Preop Vitals  BP Readings from Last 3 Encounters:   18 152/86   18 (!) 149/105   09/15/17 136/88  "   Pulse Readings from Last 3 Encounters:   11/05/18 105   07/23/18 75   09/15/17 85      Resp Readings from Last 3 Encounters:   11/05/18 18   07/23/18 16   09/15/17 20    SpO2 Readings from Last 3 Encounters:   11/05/18 99%   07/23/18 100%   09/15/17 99%      Temp Readings from Last 1 Encounters:   11/05/18 97.9  F (36.6  C) (Temporal)    Ht Readings from Last 1 Encounters:   11/05/18 1.962 m (6' 5.25\")      Wt Readings from Last 1 Encounters:   11/05/18 90.2 kg (198 lb 14.4 oz)    Estimated body mass index is 23.43 kg/m  as calculated from the following:    Height as of 11/5/18: 1.962 m (6' 5.25\").    Weight as of 11/5/18: 90.2 kg (198 lb 14.4 oz).       Anesthesia Plan      History & Physical Review  History and physical reviewed and following examination; no interval change.    ASA Status:  1 .    NPO Status:  > 4 hours    Plan for MAC with Intravenous and Propofol induction. Maintenance will be TIVA.  Reason for MAC:  Deep or markedly invasive procedure (G8)    Dr. Montgomery to do the H&P prior to administration of anesthesia.      Postoperative Care  Postoperative pain management:  IV analgesics.      Consents  Anesthetic plan, risks, benefits and alternatives discussed with:  Patient..                 SUDHA Bhagat CRNA  "

## 2018-12-10 NOTE — ANESTHESIA POSTPROCEDURE EVALUATION
Patient: Cyrus Sanford    Procedure(s):  COLONOSCOPY    Diagnosis:screening  Diagnosis Additional Information: No value filed.    Anesthesia Type:  MAC    Note:  Anesthesia Post Evaluation    Patient location during evaluation: Phase 2  Patient participation: Able to fully participate in evaluation  Level of consciousness: awake and alert  Pain management: adequate  Airway patency: patent  Cardiovascular status: acceptable  Respiratory status: acceptable  Hydration status: acceptable  PONV: none     Anesthetic complications: None          Last vitals:  Vitals:    12/10/18 1110 12/10/18 1324   BP: (!) 142/101 91/59   Resp: 16 16   Temp: 97.5  F (36.4  C)    SpO2: 100% 97%         Electronically Signed By: SUDHA Bhagat CRNA  December 10, 2018  1:35 PM

## 2018-12-10 NOTE — ANESTHESIA CARE TRANSFER NOTE
Patient: Cyrus Sanford    Procedure(s):  COLONOSCOPY    Diagnosis: screening  Diagnosis Additional Information: No value filed.    Anesthesia Type:   MAC     Note:  Airway :Face Mask  Patient transferred to:Phase II  Handoff Report: Identifed the Patient, Identified the Reponsible Provider, Reviewed the pertinent medical history, Discussed the surgical course, Reviewed Intra-OP anesthesia mangement and issues during anesthesia, Set expectations for post-procedure period and Allowed opportunity for questions and acknowledgement of understanding      Vitals: (Last set prior to Anesthesia Care Transfer)    CRNA VITALS  12/10/2018 1249 - 12/10/2018 1332      12/10/2018             Resp Rate (observed):  19                Electronically Signed By: SUDHA Bhagat CRNA  December 10, 2018  1:32 PM

## 2018-12-10 NOTE — H&P
"Pre-Endoscopy History and Physical     Cyrus Sanford MRN# [id number]   YOB: 1970 Age: 48 year old     Date of Procedure: 12/10/2018  Primary care provider: Richard Barroso  Type of Endoscopy: colonoscopy  Type of Anesthesia Anticipated: MAC     HPI:    Cyrus is a 48 year old male who will be undergoing the above procedure.      Cyrus is feeling well today. Can get up a single flight of stairs without dyspnea. Estimated METS > 4.     Patient Active Problem List   Diagnosis     Tenosynovitis of finger     CARDIOVASCULAR SCREENING; LDL GOAL LESS THAN 130     Hypertension goal BP (blood pressure) < 140/90        Medications Prior to Admission   Medication Sig Dispense Refill Last Dose     amLODIPine (NORVASC) 5 MG tablet Take 1 tablet (5 mg) by mouth daily 30 tablet 1 12/9/2018 at Unknown time     atorvastatin (LIPITOR) 40 MG tablet Take 1 tablet (40 mg) by mouth daily 90 tablet 1 12/9/2018 at Unknown time     IBUPROFEN PO 4 tabs daily    at Unknown time        REVIEW OF SYSTEMS:     5 point ROS negative except as noted above in HPI, including Gen., Resp., CV, GI &  system review.      PHYSICAL EXAM:   BP (!) 142/101   Temp 97.5  F (36.4  C) (Oral)   Resp 16   SpO2 100%    Estimated body mass index is 23.43 kg/m  as calculated from the following:    Height as of 11/5/18: 1.962 m (6' 5.25\").    Weight as of 11/5/18: 90.2 kg (198 lb 14.4 oz).    GENERAL APPEARANCE: alert and no distress  RESP: lungs clear and unlabored  CV: regular  ABD: soft, nt/nd    DIAGNOSTICS:    Not indicated      IMPRESSION   ASA Class 2 - Mild systemic disease        PLAN:     Plan for colonoscopy. No medical contraindications to proceed, or further work up needed. The risks and benefits of the procedure and the sedation options and risks were discussed with the patient. These include infection, bleeding, and small risk of colon perforation (1/1000 to 1/81007 depending on patient characteristics and type of procedure). Cyrus " was also explained to alternatives for colo-rectal screening. All questions were answered and informed consent was obtained.      The above has been forwarded to the consulting provider.      Signed Electronically by: Zoran Montgomery  December 10, 2018

## 2018-12-10 NOTE — DISCHARGE INSTRUCTIONS
[facility]    Home Care Following Endoscopy          Activity:    You have just undergone an endoscopic procedure usually performed with conscious sedation.  Do not work or operate machinery (including a car) for at least 12 hours.      I encourage you to walk and attempt to pass this air as soon as possible.    Diet:    Return to the diet you were on before your procedure but eat lightly for the first 12-24 hours.    Drink plenty of water.    Resume any regular medications unless otherwise advised by your physician.  Please begin any new medication prescribed as a result of your procedure as directed by your physician.     If you had any biopsy or polyp removed please refrain from aspirin or aspirin products for 2 days.  If on Coumadin please restart as instructed by your physician.   Pain:    You may take Tylenol as needed for pain.  Expected Recovery:    You can expect some mild abdominal fullness and/or discomfort due to the air used to inflate your intestinal tract. It is also normal to have a mild sore throat after upper endoscopy.    Call Your Physician if You Have:    After Upper Endoscopy:  o Shoulder, back or chest pain.  o Difficulty breathing or swallowing.  o Vomiting blood.    After Colonoscopy:  o Worsening persisting abdominal pain which is worse with activity.  o Fevers (>101 degrees F), chills or shakes.  o Passage of continued blood with bowel movements.   Any questions or concerns about your recovery, please call [phone] or after hours [phone] Nurse Advice Line.    Follow-up Care:    You should receive a call or letter with your results within 1 week. Please call if you have not received a notification of your results.  If asked to return to clinic please make an appointment 1 week after your procedure.  Call [phone].

## 2018-12-27 ENCOUNTER — TELEPHONE (OUTPATIENT)
Dept: FAMILY MEDICINE | Facility: CLINIC | Age: 48
End: 2018-12-27

## 2018-12-27 DIAGNOSIS — I10 HYPERTENSION GOAL BP (BLOOD PRESSURE) < 140/90: ICD-10-CM

## 2018-12-27 DIAGNOSIS — Z00.00 ROUTINE HISTORY AND PHYSICAL EXAMINATION OF ADULT: ICD-10-CM

## 2018-12-27 DIAGNOSIS — Z13.6 CARDIOVASCULAR SCREENING; LDL GOAL LESS THAN 130: Primary | ICD-10-CM

## 2018-12-27 NOTE — TELEPHONE ENCOUNTER
Reason for Call:  Other call back    Detailed comments: Pt has appt with Dharmesh on 1/3 for cholesterol check and wondering if you can add in a preop in the same time? He is having surgery on 1/31/19. Please call him and advise if you can do both on 1/3/19?     Phone Number Patient can be reached at: Home number on file [phone] (home)    Best Time: anytime    Can we leave a detailed message on this number? YES    Call taken on 12/27/2018 at 10:02 AM by Emerita Winkler

## 2018-12-27 NOTE — TELEPHONE ENCOUNTER
Called patient and informed labs are placed and needs to have labs done before appointment and will do pre op on that visit.  Leann Ariza MA

## 2018-12-28 DIAGNOSIS — Z13.6 CARDIOVASCULAR SCREENING; LDL GOAL LESS THAN 130: ICD-10-CM

## 2018-12-28 LAB
CHOLEST SERPL-MCNC: 186 MG/DL
HDLC SERPL-MCNC: 86 MG/DL
LDLC SERPL CALC-MCNC: 54 MG/DL
NONHDLC SERPL-MCNC: 100 MG/DL
TRIGL SERPL-MCNC: 230 MG/DL

## 2018-12-28 PROCEDURE — 36415 COLL VENOUS BLD VENIPUNCTURE: CPT | Performed by: FAMILY MEDICINE

## 2018-12-28 PROCEDURE — 80061 LIPID PANEL: CPT | Performed by: FAMILY MEDICINE

## 2018-12-28 NOTE — LETTER
January 14, 2019      Cyrus Sanford  [address] [facility] [address]        Dear ,    We are writing to inform you of your test results.    Labs from 2 weeks ago showed your cholesterol is at 186.  Triglycerides were up a little bit at 230.  Continue same medication     Resulted Orders   Lipid panel reflex to direct LDL Fasting   Result Value Ref Range    Cholesterol 186 <200 mg/dL    Triglycerides 230 (H) <150 mg/dL      Comment:      Borderline high:  150-199 mg/dl  High:             200-499 mg/dl  Very high:       >499 mg/dl  Fasting specimen      HDL Cholesterol 86 >39 mg/dL    LDL Cholesterol Calculated 54 <100 mg/dL      Comment:      Desirable:       <100 mg/dl    Non HDL Cholesterol 100 <130 mg/dL       If you have any questions or concerns, please call the clinic at the number listed above.       Sincerely,        Dr. Richard Barroso

## 2019-01-03 ENCOUNTER — OFFICE VISIT (OUTPATIENT)
Dept: FAMILY MEDICINE | Facility: CLINIC | Age: 49
End: 2019-01-03
Payer: COMMERCIAL

## 2019-01-03 VITALS
RESPIRATION RATE: 14 BRPM | SYSTOLIC BLOOD PRESSURE: 136 MMHG | BODY MASS INDEX: 23.13 KG/M2 | OXYGEN SATURATION: 100 % | HEART RATE: 86 BPM | DIASTOLIC BLOOD PRESSURE: 80 MMHG | HEIGHT: 78 IN | TEMPERATURE: 97.9 F | WEIGHT: 199.9 LBS

## 2019-01-03 DIAGNOSIS — Z01.818 PREOP GENERAL PHYSICAL EXAM: Primary | ICD-10-CM

## 2019-01-03 DIAGNOSIS — H26.9 CATARACT OF RIGHT EYE, UNSPECIFIED CATARACT TYPE: ICD-10-CM

## 2019-01-03 DIAGNOSIS — I10 HYPERTENSION GOAL BP (BLOOD PRESSURE) < 140/90: ICD-10-CM

## 2019-01-03 PROCEDURE — 99214 OFFICE O/P EST MOD 30 MIN: CPT | Performed by: FAMILY MEDICINE

## 2019-01-03 RX ORDER — AMLODIPINE BESYLATE 5 MG/1
5 TABLET ORAL DAILY
Qty: 90 TABLET | Refills: 1 | Status: SHIPPED | OUTPATIENT
Start: 2019-01-03 | End: 2019-07-22

## 2019-01-03 ASSESSMENT — MIFFLIN-ST. JEOR: SCORE: 1892.99

## 2019-01-03 NOTE — PROGRESS NOTES
[address] [address]  [phone]  Dept: [phone]    PRE-OP EVALUATION:  Today's date: 1/3/2019    Cyrus Sanford (: 1970) presents for pre-operative evaluation assessment as requested by Dr. Tavarez.  He requires evaluation and anesthesia risk assessment prior to undergoing surgery/procedure for treatment of Phacoemulsification with standard intraocular lens implant lens .    Fax number for surgical facility: [facility]  Primary Physician: Richard Barroso  Type of Anesthesia Anticipated: General    Patient has a Health Care Directive or Living Will:  NO    Preop Questions 1/3/2019   Who is doing your surgery? [facility]   What are you having done? cataract surgery   Date of Surgery/Procedure: 2019   Facility or Hospital where procedure/surgery will be performed: [facility]   1.  Do you have a history of Heart attack, stroke, stent, coronary bypass surgery, or other heart surgery? No   2.  Do you ever have any pain or discomfort in your chest? No   3.  Do you have a history of  Heart Failure? No   4.   Are you troubled by shortness of breath when:  walking on a level surface, or up a slight hill, or at night? No   5.  Do you currently have a cold, bronchitis or other respiratory infection? No   6.  Do you have a cough, shortness of breath, or wheezing? No   7.  Do you sometimes get pains in the calves of your legs when you walk? No   8. Do you or anyone in your family have previous history of blood clots? No   9.  Do you or does anyone in your family have a serious bleeding problem such as prolonged bleeding following surgeries or cuts? No   10. Have you ever had problems with anemia or been told to take iron pills? No   11. Have you had any abnormal blood loss such as black, tarry or bloody stools? No   12. Have you ever had a blood transfusion? No   13. Have you or any of your relatives ever had problems with anesthesia? No    14. Do you have sleep apnea, excessive snoring or daytime drowsiness? No   15. Do you have any prosthetic heart valves? No   16. Do you have prosthetic joints? No         HPI:     HPI related to upcoming procedure: Cataract in right eye          MEDICAL HISTORY:     Patient Active Problem List    Diagnosis Date Noted     Hypertension goal BP (blood pressure) < 140/90 02/05/2015     Priority: Medium     CARDIOVASCULAR SCREENING; LDL GOAL LESS THAN 130 08/29/2013     Priority: Medium     Tenosynovitis of finger 01/02/2012     Priority: Medium      Past Medical History:   Diagnosis Date     Hypertension      Uncomplicated asthma      Unspecified asthma(493.90)     Asthma     Past Surgical History:   Procedure Laterality Date     COLONOSCOPY N/A 12/10/2018    Procedure: COLONOSCOPY;  Surgeon: Zoran Montgomery MD;  Location:  GI     HC VASECTOMY UNILAT/BILAT W POSTOP SEMEN  2000    Vasectomy     Current Outpatient Medications   Medication Sig Dispense Refill     amLODIPine (NORVASC) 5 MG tablet Take 1 tablet (5 mg) by mouth daily 30 tablet 1     atorvastatin (LIPITOR) 40 MG tablet Take 1 tablet (40 mg) by mouth daily 90 tablet 1     IBUPROFEN PO 4 tabs daily       OTC products: None, except as noted above    Allergies   Allergen Reactions     No Known Allergies       Latex Allergy: NO    Social History     Tobacco Use     Smoking status: Former Smoker     Smokeless tobacco: Current User     Types: Chew     Tobacco comment: 1 tin every 3 days   Substance Use Topics     Alcohol use: Yes     Comment: occ     History   Drug Use No       REVIEW OF SYSTEMS:   Constitutional, neuro, ENT, endocrine, pulmonary, cardiac, gastrointestinal, genitourinary, musculoskeletal, integument and psychiatric systems are negative, except as otherwise noted.    EXAM:   There were no vitals taken for this visit.    GENERAL APPEARANCE: healthy, alert and no distress     EYES: EOMI,  PERRL     HENT: ear canals and TM's normal and nose  and mouth without ulcers or lesions     NECK: no adenopathy, no asymmetry, masses, or scars and thyroid normal to palpation     RESP: lungs clear to auscultation - no rales, rhonchi or wheezes     CV: regular rates and rhythm, normal S1 S2, no S3 or S4 and no murmur, click or rub     ABDOMEN:  soft, nontender, no HSM or masses and bowel sounds normal     MS: extremities normal- no gross deformities noted, no evidence of inflammation in joints, FROM in all extremities.     SKIN: no suspicious lesions or rashes     NEURO: Normal strength and tone, sensory exam grossly normal, mentation intact and speech normal     PSYCH: mentation appears normal. and affect normal/bright     LYMPHATICS: No cervical adenopathy    DIAGNOSTICS:   No labs or EKG required for low risk surgery (cataract, skin procedure, breast biopsy, etc)    Recent Labs   Lab Test 11/07/18  1056 03/09/16  1337   HGB 15.5 16.2    202    139   POTASSIUM 4.0 4.6   CR 1.19 1.25        IMPRESSION:   Reason for surgery/procedure: Right cataract    The proposed surgical procedure is considered LOW risk.    REVISED CARDIAC RISK INDEX  The patient has the following serious cardiovascular risks for perioperative complications such as (MI, PE, VFib and 3  AV Block):  No serious cardiac risks  INTERPRETATION: 0 risks: Class I (very low risk - 0.4% complication rate)    The patient has the following additional risks for perioperative complications:  No identified additional risks      ICD-10-CM    1. Preop general physical exam Z01.818        RECOMMENDATIONS:         --Patient is to take all scheduled medications on the day of surgery EXCEPT for modifications listed below.    APPROVAL GIVEN to proceed with proposed procedure, without further diagnostic evaluation       Signed Electronically by: Richard Barroso MD    Copy of this evaluation report is provided to requesting physician.    [facility] Preop Guidelines    Revised Cardiac Risk Index

## 2019-01-14 NOTE — RESULT ENCOUNTER NOTE
Labs from 2 weeks ago showed your cholesterol is at 186.  Triglycerides were up a little bit at 230.  Continue same medication

## 2019-01-30 ENCOUNTER — ANESTHESIA EVENT (OUTPATIENT)
Dept: SURGERY | Facility: CLINIC | Age: 49
End: 2019-01-30
Payer: COMMERCIAL

## 2019-01-30 ASSESSMENT — LIFESTYLE VARIABLES: TOBACCO_USE: 1

## 2019-01-31 ENCOUNTER — ANESTHESIA (OUTPATIENT)
Dept: SURGERY | Facility: CLINIC | Age: 49
End: 2019-01-31
Payer: COMMERCIAL

## 2019-01-31 ENCOUNTER — HOSPITAL ENCOUNTER (OUTPATIENT)
Facility: CLINIC | Age: 49
Discharge: HOME | End: 2019-01-31
Attending: OPHTHALMOLOGY | Admitting: OPHTHALMOLOGY
Payer: COMMERCIAL

## 2019-01-31 VITALS
HEART RATE: 57 BPM | RESPIRATION RATE: 16 BRPM | OXYGEN SATURATION: 99 % | TEMPERATURE: 98.1 F | SYSTOLIC BLOOD PRESSURE: 138 MMHG | DIASTOLIC BLOOD PRESSURE: 101 MMHG

## 2019-01-31 PROCEDURE — 25000128 H RX IP 250 OP 636: Performed by: OPHTHALMOLOGY

## 2019-01-31 PROCEDURE — 71000022 ZZH RECOVERY CATRACT PACKAGE: Performed by: OPHTHALMOLOGY

## 2019-01-31 PROCEDURE — 25000125 ZZHC RX 250: Performed by: OPHTHALMOLOGY

## 2019-01-31 PROCEDURE — 25000128 H RX IP 250 OP 636: Performed by: NURSE ANESTHETIST, CERTIFIED REGISTERED

## 2019-01-31 PROCEDURE — 25000125 ZZHC RX 250: Performed by: NURSE ANESTHETIST, CERTIFIED REGISTERED

## 2019-01-31 PROCEDURE — V2632 POST CHMBR INTRAOCULAR LENS: HCPCS | Performed by: OPHTHALMOLOGY

## 2019-01-31 PROCEDURE — 37000012 ZZH ANESTHESIA CATARACT PACKAGE: Performed by: OPHTHALMOLOGY

## 2019-01-31 PROCEDURE — 36000025 ZZH CATARACT SURGICAL PACKAGE: Performed by: OPHTHALMOLOGY

## 2019-01-31 DEVICE — EYE IMP IOL AMO PCL TECNIS ZCB00 16.5: Type: IMPLANTABLE DEVICE | Site: EYE | Status: FUNCTIONAL

## 2019-01-31 RX ORDER — TROPICAMIDE 10 MG/ML
1 SOLUTION/ DROPS OPHTHALMIC
Status: COMPLETED | OUTPATIENT
Start: 2019-01-31 | End: 2019-01-31

## 2019-01-31 RX ORDER — KETAMINE HYDROCHLORIDE 10 MG/ML
INJECTION INTRAMUSCULAR; INTRAVENOUS PRN
Status: DISCONTINUED | OUTPATIENT
Start: 2019-01-31 | End: 2019-01-31

## 2019-01-31 RX ORDER — NALOXONE HYDROCHLORIDE 0.4 MG/ML
.1-.4 INJECTION, SOLUTION INTRAMUSCULAR; INTRAVENOUS; SUBCUTANEOUS
Status: CANCELLED | OUTPATIENT
Start: 2019-01-31 | End: 2019-02-01

## 2019-01-31 RX ORDER — ONDANSETRON 4 MG/1
4 TABLET, ORALLY DISINTEGRATING ORAL EVERY 30 MIN PRN
Status: CANCELLED | OUTPATIENT
Start: 2019-01-31

## 2019-01-31 RX ORDER — PHENYLEPHRINE HYDROCHLORIDE 25 MG/ML
1 SOLUTION/ DROPS OPHTHALMIC
Status: COMPLETED | OUTPATIENT
Start: 2019-01-31 | End: 2019-01-31

## 2019-01-31 RX ORDER — PROPARACAINE HYDROCHLORIDE 5 MG/ML
1 SOLUTION/ DROPS OPHTHALMIC ONCE
Status: COMPLETED | OUTPATIENT
Start: 2019-01-31 | End: 2019-01-31

## 2019-01-31 RX ORDER — LIDOCAINE HYDROCHLORIDE 10 MG/ML
INJECTION, SOLUTION INFILTRATION; PERINEURAL PRN
Status: DISCONTINUED | OUTPATIENT
Start: 2019-01-31 | End: 2019-01-31 | Stop reason: HOSPADM

## 2019-01-31 RX ORDER — ONDANSETRON 2 MG/ML
4 INJECTION INTRAMUSCULAR; INTRAVENOUS EVERY 30 MIN PRN
Status: CANCELLED | OUTPATIENT
Start: 2019-01-31

## 2019-01-31 RX ORDER — CYCLOPENTOLATE HYDROCHLORIDE 10 MG/ML
1 SOLUTION/ DROPS OPHTHALMIC
Status: COMPLETED | OUTPATIENT
Start: 2019-01-31 | End: 2019-01-31

## 2019-01-31 RX ORDER — LIDOCAINE 40 MG/G
CREAM TOPICAL
Status: DISCONTINUED | OUTPATIENT
Start: 2019-01-31 | End: 2019-01-31 | Stop reason: HOSPADM

## 2019-01-31 RX ORDER — PROPARACAINE HYDROCHLORIDE 5 MG/ML
1 SOLUTION/ DROPS OPHTHALMIC ONCE
Status: DISCONTINUED | OUTPATIENT
Start: 2019-01-31 | End: 2019-01-31 | Stop reason: HOSPADM

## 2019-01-31 RX ORDER — SODIUM CHLORIDE, SODIUM LACTATE, POTASSIUM CHLORIDE, CALCIUM CHLORIDE 600; 310; 30; 20 MG/100ML; MG/100ML; MG/100ML; MG/100ML
INJECTION, SOLUTION INTRAVENOUS CONTINUOUS
Status: CANCELLED | OUTPATIENT
Start: 2019-01-31

## 2019-01-31 RX ADMIN — TROPICAMIDE 1 DROP: 10 SOLUTION/ DROPS OPHTHALMIC at 10:17

## 2019-01-31 RX ADMIN — PHENYLEPHRINE HYDROCHLORIDE 1 DROP: 25 SOLUTION/ DROPS OPHTHALMIC at 10:02

## 2019-01-31 RX ADMIN — PHENYLEPHRINE HYDROCHLORIDE 1 DROP: 25 SOLUTION/ DROPS OPHTHALMIC at 10:16

## 2019-01-31 RX ADMIN — MIDAZOLAM 2 MG: 1 INJECTION INTRAMUSCULAR; INTRAVENOUS at 10:53

## 2019-01-31 RX ADMIN — PHENYLEPHRINE HYDROCHLORIDE 1 DROP: 25 SOLUTION/ DROPS OPHTHALMIC at 10:07

## 2019-01-31 RX ADMIN — TROPICAMIDE 1 DROP: 10 SOLUTION/ DROPS OPHTHALMIC at 10:08

## 2019-01-31 RX ADMIN — TROPICAMIDE 1 DROP: 10 SOLUTION/ DROPS OPHTHALMIC at 10:03

## 2019-01-31 RX ADMIN — LIDOCAINE HYDROCHLORIDE 5 ML: 10 INJECTION, SOLUTION EPIDURAL; INFILTRATION; INTRACAUDAL; PERINEURAL at 10:28

## 2019-01-31 RX ADMIN — CYCLOPENTOLATE HYDROCHLORIDE 1 DROP: 10 SOLUTION OPHTHALMIC at 10:05

## 2019-01-31 RX ADMIN — MIDAZOLAM 2 MG: 1 INJECTION INTRAMUSCULAR; INTRAVENOUS at 10:57

## 2019-01-31 RX ADMIN — Medication 10 MG: at 10:58

## 2019-01-31 RX ADMIN — CYCLOPENTOLATE HYDROCHLORIDE 1 DROP: 10 SOLUTION OPHTHALMIC at 10:00

## 2019-01-31 RX ADMIN — CYCLOPENTOLATE HYDROCHLORIDE 1 DROP: 10 SOLUTION OPHTHALMIC at 10:14

## 2019-01-31 RX ADMIN — PROPARACAINE HYDROCHLORIDE 1 DROP: 5 SOLUTION/ DROPS OPHTHALMIC at 09:59

## 2019-01-31 NOTE — DISCHARGE INSTRUCTIONS
POST CATARACT SURGERY EYE INSTRUCTIONS  DR. SILVEIRA           Eye Medications    VIGAMOX/OFLOXACIN (Tan Cap)    KETOROLAC (Riley Cap)    PREDNISOLONE (Pink or White Cap)    If you opted for the individual bottles of eye medications follow these instructions.    *Instill one drop from each bottle into the operative eye 3 times a day until each  bottle is empty.    *Wait 5 minutes between each drop.    If you opted for the one bottle containing all 3 eye medications, follow these instructions.    *Instill one drop into the operative eye 3 times a day until the bottle is empty.       - If your are currently being treated for glaucoma please continue your    medication as normally prescribed.     - Wear eye shield while sleeping for 3 days     - Refrain from heavy lifting, bending, straining, or strenuous activity for 1      week.     - Do not rub or push on the operative eye for 1 week (you may wipe the    eye lid(s) gently with a wet wash cloth to remove matter from                         eyelashes).     - You may bathe or shower, but do not get the eye wet for 1 month      (swimming, hot tubs or other water activities).     - Minor itching, scratching sensation, burning sensation, etc. is normal.     Report any severe eye pain or loss of vision.     - Please call our office at [phone] if you have questions or     concerns.      [facility]   Adult Discharge Orders & Instructions     For 24 hours after surgery    1. Get plenty of rest.  A responsible adult must stay with you for at least 24 hours after you leave the hospital.   2. Do not drive or use heavy equipment.  If you have weakness or tingling, don't drive or use heavy equipment until this feeling goes away.  3. Do not drink alcohol.  4. Avoid strenuous or risky activities.  Ask for help when climbing stairs.   5. You may feel lightheaded.  If so, sit for a few minutes before standing.  Have someone help you get up.   6. You may  have a slight fever. Call the doctor if your fever is over 100 F (37.7 C) (taken under the tongue) or lasts longer than 24 hours.  7. You may have a dry mouth, a sore throat, muscle aches or trouble sleeping.  These should go away after 24 hours.  8. Do not make important or legal decisions.  We don t expect you to have any problems from the surgery or treatment you had today. Just in case, here s what to do if you have pain, upset stomach (nausea), bleeding or infection:  Pain:  Take medicines your doctor has prescribed or over-the-counter medicine they have suggested. Resting and using ice packs can help, too. For surgery on an arm or leg, raise it on a pillow to ease swelling. Call your doctor if these methods don t work.  Copyright [employer], Licensed under [employer]  Upset stomach (nausea):  Take anti-nausea medicine approved by your doctor. Drink clear liquids like apple juice, ginger ale, broth or 7-Up. Be sure to drink enough fluids. Rest can help, too. Move to normal foods when you re ready. Bleeding:  In the first 24 hours, you may see a little blood on your dressing, about the size of a quarter. You don t need to worry about this much blood, but if the blood spot keeps getting bigger:    Put pressure on the wound if you can, AND    Call your doctor.  Copyright [employer], Licensed under [employer]  Fever/Infection: Please call your doctor if you have any of these signs:    Redness    Swelling    Wound feels warm    Pain gets worse    Bad-smelling fluid leaks from wound    Fever or chills  Call your doctor for any of the followin.  It has been over 8 to 10 hours since surgery and you are still not able to urinate (pass water).    2.  Headache for over 24 hours.    3.  Numbness, tingling or weakness in your legs the day after surgery (if you had spinal anesthesia).    Nurse advice line: [phone]

## 2019-01-31 NOTE — ANESTHESIA PREPROCEDURE EVALUATION
Anesthesia Pre-Procedure Evaluation    Patient: Cyrus Sanford   MRN: [id number] : 1970          Preoperative Diagnosis: NUCLEAR CATARACT    Procedure(s):  PHACOEMULSIFICATION WITH STANDARD INTRAOCULAR LENS IMPLANT RIGHT    Past Medical History:   Diagnosis Date     Hypertension      Uncomplicated asthma      Unspecified asthma(493.90)     Asthma     Past Surgical History:   Procedure Laterality Date     COLONOSCOPY N/A 12/10/2018    Procedure: COLONOSCOPY;  Surgeon: Zoran Montgomery MD;  Location: [facility]     HC VASECTOMY UNILAT/BILAT W POSTOP SEMEN  2000    Vasectomy       Anesthesia Evaluation     . Pt has had prior anesthetic. Type: MAC           ROS/MED HX    ENT/Pulmonary: Comment: Current smokeless tobacco user    (+)tobacco use, Past use Intermittent asthma Treatment: Inhaler prn,  , . .    Neurologic:  - neg neurologic ROS     Cardiovascular:     (+) hypertension-range: 140/90, ---. : . . . :. . No previous cardiac testing       METS/Exercise Tolerance:  >4 METS   Hematologic:  - neg hematologic  ROS       Musculoskeletal:  - neg musculoskeletal ROS       GI/Hepatic:  - neg GI/hepatic ROS       Renal/Genitourinary:  - ROS Renal section negative       Endo:  - neg endo ROS       Psychiatric:  - neg psychiatric ROS       Infectious Disease:  - neg infectious disease ROS       Malignancy:      - no malignancy   Other:    - neg other ROS                      Physical Exam  Normal systems: cardiovascular, pulmonary and dental    Airway   Mallampati: II  TM distance: >3 FB  Neck ROM: full    Dental     Cardiovascular   Rhythm and rate: regular and normal      Pulmonary    breath sounds clear to auscultation            Lab Results   Component Value Date    WBC 5.1 2018    HGB 15.5 2018    HCT 47.9 2018     2018     2018    POTASSIUM 4.0 2018    CHLORIDE 105 2018    CO2 27 2018    BUN 16 2018    CR 1.19 2018     (H)  "11/07/2018    ELISE 9.0 11/07/2018    ALBUMIN 3.8 11/07/2018    PROTTOTAL 7.6 11/07/2018    ALT 32 11/07/2018    AST 18 11/07/2018    ALKPHOS 64 11/07/2018    BILITOTAL 0.9 11/07/2018    TSH 1.60 03/21/2015       Preop Vitals  BP Readings from Last 3 Encounters:   01/03/19 136/80   12/10/18 (!) 135/105   11/05/18 152/86    Pulse Readings from Last 3 Encounters:   01/03/19 86   11/05/18 105   07/23/18 75      Resp Readings from Last 3 Encounters:   01/03/19 14   12/10/18 16   11/05/18 18    SpO2 Readings from Last 3 Encounters:   01/03/19 100%   12/10/18 100%   11/05/18 99%      Temp Readings from Last 1 Encounters:   01/03/19 97.9  F (36.6  C) (Temporal)    Ht Readings from Last 1 Encounters:   01/03/19 1.97 m (6' 5.56\")      Wt Readings from Last 1 Encounters:   01/03/19 90.7 kg (199 lb 14.4 oz)    Estimated body mass index is 23.36 kg/m  as calculated from the following:    Height as of 1/3/19: 1.97 m (6' 5.56\").    Weight as of 1/3/19: 90.7 kg (199 lb 14.4 oz).       Anesthesia Plan      History & Physical Review  History and physical reviewed and following examination; no interval change.    ASA Status:  2 .    NPO Status:  > 8 hours    Plan for MAC Reason for MAC:  Deep or markedly invasive procedure (G8)         Postoperative Care  Postoperative pain management:  IV analgesics and Oral pain medications.      Consents  Anesthetic plan, risks, benefits and alternatives discussed with:  Patient.  Use of blood products discussed: No .   .                 SUDHA Alva CRNA  "

## 2019-01-31 NOTE — OP NOTE
PREOPERATIVE DIAGNOSIS: Visually significant cataract, Right eye   POSTOPERATIVE DIAGNOSIS: Same   PROCEDURES:   1. Cataract extraction with intraocular lens implant Right eye.  SURGEON: Loyd Tavarez M.D.  INDICATIONS: The patient Cyrus Sanford presented to the eye clinic with decreased vision secondary to cataract in the Right eye. The risks, including, but not limited to infection, loss of vision, loss of eye, need for more surgery, and bleeding, along with the benefits, alternatives, expectations, and the procedure itself were discussed at length with the patient who wished to proceed with surgery. All questions were answered to the patient's satisfaction. The stated procedure is still clinically indicated.   DESCRIPTION OF PROCEDURE:   Prior to the procedure, appropriate cardiac and respiratory monitors were applied to the patient.  In the pre-operative holding area, a drop of topical tetracaine followed by povidone iodine followed by lidocaine gel.  The patient was brought to the operating room where a surgical pause was carried out to identify with all members of the surgical team the correct surgical site.  With adequate anesthesia, the Right eye was prepped and draped in the usual sterile fashion. A lid speculum was placed, and the operating microscope was rotated into position. A paracentesis was created.  Through this limbal paracentesis, the anterior chamber was filled with preservative-free lidocaine followed by viscoelastic.   A temporal clear corneal incision was created at the limbus using a 2.5 mm blade. A capsulorrhexis was initiated using a cystotome and was completed in continuous and circular fashion using the capsulorrhexis forceps. The lens nucleus was hydrodissected using balanced salt solution.  The lens nucleus was rotated and removed using phacoemulsification in a stop and chop technique.  Residual cortical material was removed using irrigation-aspiration.  The capsular bag was  reinflated to its maximal extent with cohesive viscoelastic.  A 16.5 diopter ZCBOO was inserted into the capsular bag and noted to be well centered.  The lens power selected was reviewed using the intraocular lens power measurements that were obtained preoperatively to confirm that the correct lens was selected for the desired post-operative refractive state. The residual viscoelastic was aspirated. The anterior chamber was inflated with balanced salt solution and the wounds were hydrated and found to be self-sealing.  The eye was palpated and found to be of normal physiologic pressure. The lid speculum was removed. One drop of postoperative anti-inflammatory and antibiotic medication was instilled in the eye and a clear shield placed over the eye. The patient tolerated the procedure well and there were no intraoperative complications.      PLAN: The patient will be discharged to home and will follow up tomorrow in the eye clinic.  EBL:  None  Complications:  None  Implant Name Type Inv. Item Serial No.  Lot No. LRB No. Used   EYE IMP IOL DOUGLAS PCL TECNIS ZCB00 16.5 Lens/Eye Implant EYE IMP IOL DOUGLAS PCL TECNIS ZCB00 16.5 [id number] [employer]  Right 1

## 2019-01-31 NOTE — ANESTHESIA CARE TRANSFER NOTE
Patient: Cyrus Sanford    Procedure(s):  PHACOEMULSIFICATION WITH STANDARD INTRAOCULAR LENS IMPLANT RIGHT    Diagnosis: NUCLEAR CATARACT  Diagnosis Additional Information: No value filed.    Anesthesia Type:   MAC     Note:  Airway :Room Air  Patient transferred to:Phase II  Handoff Report: Identifed the Patient, Identified the Reponsible Provider, Reviewed the pertinent medical history, Discussed the surgical course, Reviewed Intra-OP anesthesia mangement and issues during anesthesia, Set expectations for post-procedure period and Allowed opportunity for questions and acknowledgement of understanding      Vitals: (Last set prior to Anesthesia Care Transfer)    CRNA VITALS  1/31/2019 1046 - 1/31/2019 1121      1/31/2019             Pulse:  51    SpO2:  100 %                Electronically Signed By: SUDHA Alva CRNA  January 31, 2019  11:21 AM

## 2019-03-06 ENCOUNTER — OFFICE VISIT (OUTPATIENT)
Dept: URGENT CARE | Facility: RETAIL CLINIC | Age: 49
End: 2019-03-06
Payer: COMMERCIAL

## 2019-03-06 VITALS
OXYGEN SATURATION: 100 % | SYSTOLIC BLOOD PRESSURE: 135 MMHG | HEART RATE: 68 BPM | TEMPERATURE: 98.5 F | DIASTOLIC BLOOD PRESSURE: 90 MMHG

## 2019-03-06 DIAGNOSIS — I10 HYPERTENSION GOAL BP (BLOOD PRESSURE) < 140/90: ICD-10-CM

## 2019-03-06 DIAGNOSIS — R05.8 PRODUCTIVE COUGH: Primary | ICD-10-CM

## 2019-03-06 PROCEDURE — 99213 OFFICE O/P EST LOW 20 MIN: CPT | Performed by: FAMILY MEDICINE

## 2019-03-06 RX ORDER — DOXYCYCLINE 100 MG/1
100 CAPSULE ORAL 2 TIMES DAILY
Qty: 20 CAPSULE | Refills: 0 | Status: SHIPPED | OUTPATIENT
Start: 2019-03-06 | End: 2019-03-16

## 2019-03-06 NOTE — PROGRESS NOTES
SUBJECTIVE:  Chief Complaint   Patient presents with     Cough     x 5 days      Pharyngitis     x a week     Cyrus Sanford is a 48 year old male who presents to the clinic today with a chief complaint of cough  and sore throat, chest tightness for 7 day(s).  Patient denies central chest pain., pleuritic chest pain and wheezing.  His cough is described as persistent, daytime, nightime and productive mucoid.    The patient's symptoms are moderate and worsening.  Associated symptoms include nasal congestion, malaise and myalgias. The patient's symptoms are exacerbated by no particular triggers  Patient has been using OTC cough suppressants  to improve symptoms.    Has history of hypertension, takes his amlodipine around noon daily,   Does home BP checks with usual level 130/80    Past Medical History:   Diagnosis Date     Hypertension      Uncomplicated asthma      Unspecified asthma(493.90)     Asthma     Patient Active Problem List   Diagnosis     Tenosynovitis of finger     CARDIOVASCULAR SCREENING; LDL GOAL LESS THAN 130     Hypertension goal BP (blood pressure) < 140/90     Cataract of right eye, unspecified cataract type       ALLERGIES:  No known allergies      Current Outpatient Medications on File Prior to Visit:  amLODIPine (NORVASC) 5 MG tablet Take 1 tablet (5 mg) by mouth daily   atorvastatin (LIPITOR) 40 MG tablet Take 1 tablet (40 mg) by mouth daily   IBUPROFEN PO 4 tabs daily     No current facility-administered medications on file prior to visit.     Social History     Tobacco Use     Smoking status: Former Smoker     Smokeless tobacco: Current User     Types: Chew     Tobacco comment: 1 tin every 3 days   Substance Use Topics     Alcohol use: Yes     Comment: occ       Family History   Problem Relation Age of Onset     Respiratory Father         Asthma     Cancer Sister          ROS  CONSTITUTIONAL:NEGATIVE for fever, chills,   INTEGUMENTARY/SKIN: NEGATIVE for worrisome rashes, lesions  EYES:  NEGATIVE for vision changes or irritation  ENT/MOUTH: NEGATIVE for ear, mouth problems, some sore throat  GI: NEGATIVE for nausea, abdominal pain,     OBJECTIVE:  /90   Pulse 68   Temp 98.5  F (36.9  C) (Oral)   SpO2 100%   GENERAL APPEARANCE: alert, moderate distress and cooperative, frequent harsh dry cough  EYES: EOMI,  PERRL, conjunctiva clear  HENT: ear canals and TM's normal.  Nose and mouth without ulcers, erythema or lesions  NECK: supple, nontender, no lymphadenopathy  RESP: lungs clear to auscultation - no rales, rhonchi or wheezes  CV: regular rates and rhythm, normal S1 S2, no murmur noted  NEURO: Normal strength and tone, sensory exam grossly normal,  normal speech and mentation  SKIN: no suspicious lesions or rashes       ASSESSMENT:    Hypertension goal BP (blood pressure) < 140/90   normal BP on second check  Blood pressure taken today was  Normal / elevated.    Patient should continue hypertension medications at current dose and schedule.    Frequent home/ store blood pressure checks are encouraged at different times of day.  Patient should keep a diary of blood pressure levels and share that information with the primary care provider.    Productive cough     - doxycycline hyclate (VIBRAMYCIN) 100 MG capsule; Take 1 capsule (100 mg) by mouth 2 times daily for 10 days      We discussed that based on the patient's description of symptoms, history and physical exam, that a bacterial infection has likely developed in the chest requiring treatment with antibiotics.     We discussed that the chest infection often starts as a viral illness, however, over time, the secretions in the chest can start to grow bacteria, with increased chest discomfort , productive sputum.  Antibiotics are only helpful when bacteria has started to grow in the respiratory secretions.   Any residual symptoms from a viral illness will not respond to the antibiotic.    The patient is advised to monitor the symptoms of the  illness, and if worsening,  worse chest pain, increased productive sputum, persistent fevers/ chills, shortness of breath, then seek re-evaluation with primary care, UC or ER     Albuterol inhaler - declined     Symptomatic measures encouraged, humidified air, plenty of fluids.  Patient may consider OTC expectorant and/or cough suppressant to treat symptoms.   acetaminophen, ibuprofen for pain and fever    Return if worsening

## 2019-03-06 NOTE — LETTER
[facility]  [address]  [facility] [address]  [phone]      March 6, 2019    RE:  Cyrus Sanford                                                                                                                                                       [id number] [facility] [address]            To whom it may concern:    Cyrus Sanford is under my professional care for    Hypertension goal BP (blood pressure) < 140/90  Productive cough.    May return to work   with no restrictions when shortness of breath, severe cough, fevers and chills are resolved.          Sincerely,        Shruti Joseph MD    [facility]

## 2019-03-06 NOTE — PATIENT INSTRUCTIONS

## 2019-05-28 DIAGNOSIS — I10 HYPERTENSION GOAL BP (BLOOD PRESSURE) < 140/90: ICD-10-CM

## 2019-05-28 DIAGNOSIS — Z13.6 CARDIOVASCULAR SCREENING; LDL GOAL LESS THAN 130: ICD-10-CM

## 2019-05-30 RX ORDER — ATORVASTATIN CALCIUM 40 MG/1
TABLET, FILM COATED ORAL
Qty: 90 TABLET | Refills: 1 | Status: SHIPPED | OUTPATIENT
Start: 2019-05-30 | End: 2019-11-29

## 2019-05-30 NOTE — TELEPHONE ENCOUNTER
"Requested Prescriptions   Pending Prescriptions Disp Refills     atorvastatin (LIPITOR) 40 MG tablet [Pharmacy Med Name: ATORVASTATIN 40MG   TAB] 90 tablet 1     Sig: TAKE 1 TABLET BY MOUTH ONCE DAILY   Last Written Prescription Date:  11/14/2018  Last Fill Quantity: 90,  # refills: 1   Last office visit: 1/3/2019 with prescribing provider:     Future Office Visit:        Statins Protocol Passed - 5/28/2019 10:51 AM        Passed - LDL on file in past 12 months     Recent Labs   Lab Test 12/28/18  0916   LDL 54             Passed - No abnormal creatine kinase in past 12 months     No lab results found.             Passed - Recent (12 mo) or future (30 days) visit within the authorizing provider's specialty     Patient had office visit in the last 12 months or has a visit in the next 30 days with authorizing provider or within the authorizing provider's specialty.  See \"Patient Info\" tab in inbasket, or \"Choose Columns\" in Meds & Orders section of the refill encounter.              Passed - Medication is active on med list        Passed - Patient is age 18 or older      Prescription approved per [facility] Refill Protocol.  Tata Hannah RN      "

## 2019-07-22 DIAGNOSIS — I10 HYPERTENSION GOAL BP (BLOOD PRESSURE) < 140/90: ICD-10-CM

## 2019-07-23 RX ORDER — AMLODIPINE BESYLATE 5 MG/1
TABLET ORAL
Qty: 90 TABLET | Refills: 0 | Status: SHIPPED | OUTPATIENT
Start: 2019-07-23 | End: 2019-10-21

## 2019-07-23 NOTE — TELEPHONE ENCOUNTER
"Requested Prescriptions   Pending Prescriptions Disp Refills     amLODIPine (NORVASC) 5 MG tablet [Pharmacy Med Name: AMLODIPINE 5MG TAB] 90 tablet 1     Sig: TAKE 1 TABLET BY MOUTH ONCE DAILY   Last Written Prescription Date:  1/3/19  Last Fill Quantity: 90,  # refills: 1   Last office visit: 1/3/2019 with prescribing provider:  11/5/18   Future Office Visit:        Calcium Channel Blockers Protocol  Failed - 7/22/2019  5:45 PM        Failed - Blood pressure under 140/90 in past 12 months     BP Readings from Last 3 Encounters:   03/06/19 135/90   01/31/19 (!) 138/101   01/03/19 136/80                 Passed - Recent (12 mo) or future (30 days) visit within the authorizing provider's specialty     Patient had office visit in the last 12 months or has a visit in the next 30 days with authorizing provider or within the authorizing provider's specialty.  See \"Patient Info\" tab in inbasket, or \"Choose Columns\" in Meds & Orders section of the refill encounter.              Passed - Medication is active on med list        Passed - Patient is age 18 or older        Passed - Normal serum creatinine on file in past 12 months     Recent Labs   Lab Test 11/07/18  1056   CR 1.19             Routing refill request to provider for review/approval because:  Labs out of range:  BP  T'd up 3 month for provider review.    Lisa Banks, SEANN, RN            "

## 2019-10-12 ENCOUNTER — APPOINTMENT (OUTPATIENT)
Dept: GENERAL RADIOLOGY | Facility: CLINIC | Age: 49
End: 2019-10-12
Attending: NURSE PRACTITIONER
Payer: COMMERCIAL

## 2019-10-12 ENCOUNTER — HOSPITAL ENCOUNTER (EMERGENCY)
Facility: CLINIC | Age: 49
Discharge: HOME | End: 2019-10-12
Attending: NURSE PRACTITIONER | Admitting: NURSE PRACTITIONER
Payer: COMMERCIAL

## 2019-10-12 VITALS
SYSTOLIC BLOOD PRESSURE: 147 MMHG | WEIGHT: 199 LBS | BODY MASS INDEX: 23.26 KG/M2 | TEMPERATURE: 97.2 F | DIASTOLIC BLOOD PRESSURE: 90 MMHG | HEART RATE: 79 BPM | RESPIRATION RATE: 16 BRPM

## 2019-10-12 PROCEDURE — 99283 EMERGENCY DEPT VISIT LOW MDM: CPT | Performed by: NURSE PRACTITIONER

## 2019-10-12 PROCEDURE — 99283 EMERGENCY DEPT VISIT LOW MDM: CPT | Mod: Z6 | Performed by: NURSE PRACTITIONER

## 2019-10-12 PROCEDURE — 73140 X-RAY EXAM OF FINGER(S): CPT | Mod: TC,RT

## 2019-10-12 ASSESSMENT — ENCOUNTER SYMPTOMS
WEAKNESS: 0
JOINT SWELLING: 0
NUMBNESS: 0
BRUISES/BLEEDS EASILY: 0

## 2019-10-12 NOTE — ED AVS SNAPSHOT
[facility] Emergency Department  [address] [place] [address]  Phone:  [phone]  Fax:  [phone]                                    Cyrus Sanford   MRN: [id number]    Department:  [facility] Emergency Department   Date of Visit:  10/12/2019           After Visit Summary Signature Page    I have received my discharge instructions, and my questions have been answered. I have discussed any challenges I see with this plan with the nurse or doctor.    ..........................................................................................................................................  Patient/Patient Representative Signature      ..........................................................................................................................................  Patient Representative Print Name and Relationship to Patient    ..................................................               ................................................  Date                                   Time    ..........................................................................................................................................  Reviewed by Signature/Title    ...................................................              ..............................................  Date                                               Time          22EPIC Rev 08/18

## 2019-10-12 NOTE — ED PROVIDER NOTES
"1241  ED Triage Notes  Right index finger pain s/p \"loading a trailer\". Patient states he felt a pop in his finger.       History     Chief Complaint   Patient presents with     Hand Pain     HPI  Cyrus Sanford is a 49 year old male who was loading a trailer and he was pushing a stove onto the back and his right hand slipped hyperextending right index finger and he felt pain and a pop to mid joint. Pain with flexion. Has full ROM, no bruising, no swelling.     Onset this morning at 1030 at home.    Allergies:  Allergies   Allergen Reactions     No Known Allergies        Problem List:    Patient Active Problem List    Diagnosis Date Noted     Cataract of right eye, unspecified cataract type 01/03/2019     Priority: Medium     Hypertension goal BP (blood pressure) < 140/90 02/05/2015     Priority: Medium     CARDIOVASCULAR SCREENING; LDL GOAL LESS THAN 130 08/29/2013     Priority: Medium     Tenosynovitis of finger 01/02/2012     Priority: Medium        Past Medical History:    Past Medical History:   Diagnosis Date     Hypertension      Uncomplicated asthma      Unspecified asthma(493.90)        Past Surgical History:    Past Surgical History:   Procedure Laterality Date     COLONOSCOPY N/A 12/10/2018    Procedure: COLONOSCOPY;  Surgeon: Zoran Montgomery MD;  Location:  GI     HC VASECTOMY UNILAT/BILAT W POSTOP SEMEN  2000    Vasectomy     PHACOEMULSIFICATION WITH STANDARD INTRAOCULAR LENS IMPLANT Right 1/31/2019    Procedure: PHACOEMULSIFICATION WITH STANDARD INTRAOCULAR LENS IMPLANT RIGHT;  Surgeon: Loyd Tavarez MD;  Location:  OR       Family History:    Family History   Problem Relation Age of Onset     Respiratory Father         Asthma     Cancer Sister        Social History:  Marital Status:   [2]  Social History     Tobacco Use     Smoking status: Former Smoker     Smokeless tobacco: Current User     Types: Chew     Tobacco comment: 1 tin every 3 days   Substance Use Topics     " Alcohol use: Yes     Comment: occ     Drug use: No        Medications:    amLODIPine (NORVASC) 5 MG tablet  atorvastatin (LIPITOR) 40 MG tablet  IBUPROFEN PO          Review of Systems   Musculoskeletal: Negative for joint swelling.   Neurological: Negative for weakness and numbness.   Hematological: Does not bruise/bleed easily.       Physical Exam   BP: (!) 147/90  Pulse: 79  Temp: 97.2  F (36.2  C)  Resp: 16  Weight: 90.3 kg (199 lb)      Physical Exam  Vitals signs and nursing note reviewed.   Constitutional:       General: He is not in acute distress.     Appearance: Normal appearance.   Musculoskeletal: Normal range of motion.         General: Tenderness present.      Right hand: He exhibits tenderness. He exhibits normal range of motion, normal capillary refill and no deformity. Normal sensation noted. Normal strength noted.        Hands:    Skin:     General: Skin is warm and dry.      Capillary Refill: Capillary refill takes less than 2 seconds.   Neurological:      Mental Status: He is alert.      Sensory: Sensation is intact.      Motor: Motor function is intact.       ED Course        Procedures               Critical Care time:  none               Results for orders placed or performed during the hospital encounter of 10/12/19 (from the past 24 hour(s))   XR Finger Right G/E 2 Views    Narrative    XR FINGER RT G/E 2 VW 10/12/2019 12:59 PM     HISTORY: pain to PIP joint after feeling pop and pain    COMPARISON: 1/2/2012.      Impression    IMPRESSION: Negative index finger. No evidence for fracture or  dislocation. Joint spacing and alignment is intact.    ILIA DOMINGUEZ MD       Medications - No data to display    Assessments & Plan (with Medical Decision Making)  1: Finger strain: Discussed with patient gentle ROM exercises to finger.  Avoid lifting. Follow up with PCP in 5 days if still bothersome. At this time no fracture, dislocation, signs of ligament injury     I have reviewed the nursing  notes.    I have reviewed the findings, diagnosis, plan and need for follow up with the patient.       New Prescriptions    No medications on file       Final diagnoses:   Strain of hand and finger, right       10/12/2019   [facility] EMERGENCY DEPARTMENT     Shantelle Frias, SUDHA CNP  10/12/19 1327

## 2019-10-21 DIAGNOSIS — I10 HYPERTENSION GOAL BP (BLOOD PRESSURE) < 140/90: ICD-10-CM

## 2019-10-28 RX ORDER — AMLODIPINE BESYLATE 5 MG/1
TABLET ORAL
Qty: 30 TABLET | Refills: 0 | Status: SHIPPED | OUTPATIENT
Start: 2019-10-28 | End: 2019-11-28

## 2019-10-28 NOTE — TELEPHONE ENCOUNTER
"Routing refill request to provider for review/approval because:  Labs out of range:  BP  T'd up one month refill for provider approval   BP Readings from Last 3 Encounters:   10/12/19 (!) 147/90   03/06/19 135/90   01/31/19 (!) 138/101       Pending Prescriptions:                       Disp   Refills    amLODIPine (NORVASC) 5 MG tablet [Pharmac*30 tab*0            Sig: TAKE 1 TABLET BY MOUTH ONCE DAILY    Last Written Prescription Date:  7/23/19  Last Fill Quantity: 90,  # refills: 0   Last office visit: 1/3/2019 with prescribing provider:     Future Office Visit:      Requested Prescriptions   Pending Prescriptions Disp Refills     amLODIPine (NORVASC) 5 MG tablet [Pharmacy Med Name: AMLODIPINE 5MG TAB] 30 tablet 0     Sig: TAKE 1 TABLET BY MOUTH ONCE DAILY       Calcium Channel Blockers Protocol  Failed - 10/28/2019  2:02 PM        Failed - Blood pressure under 140/90 in past 12 months     BP Readings from Last 3 Encounters:   10/12/19 (!) 147/90   03/06/19 135/90   01/31/19 (!) 138/101                 Passed - Recent (12 mo) or future (30 days) visit within the authorizing provider's specialty     Patient has had an office visit with the authorizing provider or a provider within the authorizing providers department within the previous 12 mos or has a future within next 30 days. See \"Patient Info\" tab in inbasket, or \"Choose Columns\" in Meds & Orders section of the refill encounter.              Passed - Medication is active on med list        Passed - Patient is age 18 or older        Passed - Normal serum creatinine on file in past 12 months     Recent Labs   Lab Test 11/07/18  1056   CR 1.19             Caitlin Amor RN BSN    "

## 2019-11-01 ENCOUNTER — OFFICE VISIT (OUTPATIENT)
Dept: FAMILY MEDICINE | Facility: CLINIC | Age: 49
End: 2019-11-01
Payer: COMMERCIAL

## 2019-11-01 VITALS
HEART RATE: 94 BPM | WEIGHT: 196.4 LBS | HEIGHT: 76 IN | SYSTOLIC BLOOD PRESSURE: 122 MMHG | DIASTOLIC BLOOD PRESSURE: 88 MMHG | OXYGEN SATURATION: 98 % | BODY MASS INDEX: 23.92 KG/M2 | TEMPERATURE: 97.4 F | RESPIRATION RATE: 16 BRPM

## 2019-11-01 DIAGNOSIS — S63.691S: Primary | ICD-10-CM

## 2019-11-01 PROCEDURE — 99213 OFFICE O/P EST LOW 20 MIN: CPT | Performed by: NURSE PRACTITIONER

## 2019-11-01 ASSESSMENT — MIFFLIN-ST. JEOR: SCORE: 1847.36

## 2019-11-01 NOTE — PROGRESS NOTES
Subjective     Cyrus Sanford is a 49 year old male who presents to clinic today for the following health issues:    Patient for follow-up of his right index finger after hyperextending it 10/12/19 while loading a trailer.  He was seen by ER, x-ray was negative for fracture or avulsion, there was also no evidence of dislocation or signs of ligament injury.  He was diagnosed with a finger strain.  The finger itself is improved somewhat but he does not have full range of motion secondary to swelling, the swelling is improved but again not back to baseline.  He is been working doing his normal job, he has not been icing the hand or the finger and he has not been using NSAIDs to reduce swelling.  He came in the clinic today because he was told if the hand was not back to normal and 4 to 5 days he should return to primary care.  He has no other new symptoms of concern.    HPI   ED/UC Followup:    Facility:  [facility] ED   Date of visit: 10/12/19  Reason for visit: Strain of hand and finger right   Current Status: right finger still in pain           Patient Active Problem List   Diagnosis     Tenosynovitis of finger     CARDIOVASCULAR SCREENING; LDL GOAL LESS THAN 130     Hypertension goal BP (blood pressure) < 140/90     Cataract of right eye, unspecified cataract type     Past Surgical History:   Procedure Laterality Date     COLONOSCOPY N/A 12/10/2018    Procedure: COLONOSCOPY;  Surgeon: Zoran Montgomery MD;  Location: [facility]     HC VASECTOMY UNILAT/BILAT W POSTOP SEMEN  2000    Vasectomy     PHACOEMULSIFICATION WITH STANDARD INTRAOCULAR LENS IMPLANT Right 1/31/2019    Procedure: PHACOEMULSIFICATION WITH STANDARD INTRAOCULAR LENS IMPLANT RIGHT;  Surgeon: Loyd Tavarez MD;  Location:  OR       Social History     Tobacco Use     Smoking status: Former Smoker     Smokeless tobacco: Current User     Types: Chew     Tobacco comment: 1 tin every 3 days   Substance Use Topics     Alcohol use: Yes      "Comment: occ     Family History   Problem Relation Age of Onset     Respiratory Father         Asthma     Cancer Sister          Current Outpatient Medications   Medication Sig Dispense Refill     amLODIPine (NORVASC) 5 MG tablet TAKE 1 TABLET BY MOUTH ONCE DAILY 30 tablet 0     atorvastatin (LIPITOR) 40 MG tablet TAKE 1 TABLET BY MOUTH ONCE DAILY 90 tablet 1     IBUPROFEN PO 4 tabs daily       Allergies   Allergen Reactions     No Known Allergies          Reviewed and updated as needed this visit by Provider         Review of Systems   ROS COMP: Constitutional, HEENT, cardiovascular, pulmonary, gi and gu systems are negative, except as otherwise noted.      Objective    /88   Pulse 94   Temp 97.4  F (36.3  C) (Temporal)   Resp 16   Ht 1.93 m (6' 4\")   Wt 89.1 kg (196 lb 6.4 oz)   SpO2 98%   BMI 23.91 kg/m    Body mass index is 23.91 kg/m .  Physical Exam   GENERAL: healthy, alert and no distress  EYES: Eyes grossly normal to inspection  MS: normal muscle tone and left index finger with mild edema reduced range of motion in terms of flexion, tenderness with palpation into the palm of his hand.  No bruising or induration.  Skin is warm dry and intact, nail with good capillary refill.  SKIN: no suspicious lesions or rashes  NEURO: Normal strength and tone, mentation intact and speech normal    Diagnostic Test Results:  Xray -10/12/2019 x-ray of the left hand reviewed again showing negative for fracture dislocation joint space and alignment were intact.        Assessment & Plan     1. Other sprain of left index finger, sequela  -The hand and finger are improved but not back to baseline.  -Reviewed the natural course of healing for finger sprain which can take weeks to months.  -Provided the patient home care instructions to include rest, ice, and elevation of the hand.  Because he is working with a hand told him to tape the finger to his second finger for support during activity.  Provided him exercises to " do daily.  -Use ibuprofen 4 to 600 mg twice daily and Tylenol 1000 mg twice daily to help with inflammation and discomfort.  -Patient is to call clinic in the next 4 to 6 weeks if he does not get close to baseline.  -And if the patient calls back in the finger is not improving to his satisfaction we will send him to sports medicine with Dr. Madrid.    -Vision verbalized understanding of plan of care and is in agreement with current plan of care.    -Discussed signs and symptoms to report to ER with concerning for septic joint, patient will call with any new redness swelling pain or fever associated with the hand.        Return if symptoms worsen or fail to improve.    Bandar Tavera, NP  [facility]

## 2019-11-01 NOTE — PATIENT INSTRUCTIONS
Follow the home care instructions I provided you.     REST, ICE, Elevate.     Use Advil 200 mg tablets 2 to 3, 2 times daily. Take with food. Drink lots of water. Can also use Tylenol 1000 mg, 2 times daily.     Do the exercises and stretching daily.     Call clinic if not improved in the next 3-4 weeks. I will put order in for sports medicine at that time.     Bandar Tavera CNP

## 2019-11-28 ENCOUNTER — MYC REFILL (OUTPATIENT)
Dept: FAMILY MEDICINE | Facility: CLINIC | Age: 49
End: 2019-11-28

## 2019-11-28 DIAGNOSIS — I10 HYPERTENSION GOAL BP (BLOOD PRESSURE) < 140/90: ICD-10-CM

## 2019-11-28 DIAGNOSIS — Z13.6 CARDIOVASCULAR SCREENING; LDL GOAL LESS THAN 130: ICD-10-CM

## 2019-11-28 RX ORDER — ATORVASTATIN CALCIUM 40 MG/1
40 TABLET, FILM COATED ORAL DAILY
Qty: 90 TABLET | Refills: 1 | Status: CANCELLED | OUTPATIENT
Start: 2019-11-28

## 2019-11-29 DIAGNOSIS — Z13.6 CARDIOVASCULAR SCREENING; LDL GOAL LESS THAN 130: ICD-10-CM

## 2019-11-29 DIAGNOSIS — I10 HYPERTENSION GOAL BP (BLOOD PRESSURE) < 140/90: ICD-10-CM

## 2019-11-29 RX ORDER — ATORVASTATIN CALCIUM 40 MG/1
40 TABLET, FILM COATED ORAL DAILY
Qty: 30 TABLET | Refills: 0 | Status: SHIPPED | OUTPATIENT
Start: 2019-11-29 | End: 2019-12-16

## 2019-11-29 RX ORDER — AMLODIPINE BESYLATE 5 MG/1
5 TABLET ORAL DAILY
Qty: 30 TABLET | Refills: 0 | Status: SHIPPED | OUTPATIENT
Start: 2019-11-29 | End: 2019-12-16

## 2019-11-29 NOTE — TELEPHONE ENCOUNTER
"Routing refill request to provider for review/approval because:  Zoraida given x1 and patient did not follow up, please advise  Labs not current:  Cr    Norvasc  Last Written Prescription Date:  10/228/2019  Last Fill Quantity: 30,  # refills: 0   Last office visit: 11/1/2019 with prescribing provider:  Liang   Future Office Visit:      Requested Prescriptions   Pending Prescriptions Disp Refills     amLODIPine (NORVASC) 5 MG tablet 30 tablet 0     Sig: Take 1 tablet (5 mg) by mouth daily       Calcium Channel Blockers Protocol  Failed - 11/29/2019  8:36 AM        Failed - Normal serum creatinine on file in past 12 months     Recent Labs   Lab Test 11/07/18  1056   CR 1.19             Passed - Blood pressure under 140/90 in past 12 months     BP Readings from Last 3 Encounters:   11/01/19 122/88   10/12/19 (!) 147/90   03/06/19 135/90                 Passed - Recent (12 mo) or future (30 days) visit within the authorizing provider's specialty     Patient has had an office visit with the authorizing provider or a provider within the authorizing providers department within the previous 12 mos or has a future within next 30 days. See \"Patient Info\" tab in inbasket, or \"Choose Columns\" in Meds & Orders section of the refill encounter.              Passed - Medication is active on med list        Passed - Patient is age 18 or older        Mary Riley RN on 11/29/2019 at 9:11 AM    "

## 2019-11-29 NOTE — TELEPHONE ENCOUNTER
Zoraida refill given per RN protocol.   Please contact patient to have them schedule the following:  Due for annual exam after: 11/5/2019    Lipitor  Last Written Prescription Date:  5/300/2019  Last Fill Quantity: 90,  # refills: 1   Last office visit: 11/1/2019 with prescribing provider:  Liang   Future Office Visit:      Requested Prescriptions   Pending Prescriptions Disp Refills     atorvastatin (LIPITOR) 40 MG tablet 30 tablet 0     Sig: Take 1 tablet (40 mg) by mouth daily       There is no refill protocol information for this order        Recent Labs   Lab Test 12/28/18  0916 11/07/18  1056   CHOL 186 251*   HDL 86 72   LDL 54 158*   TRIG 230* 106       Mary Riley RN on 11/29/2019 at 9:13 AM

## 2019-12-08 ENCOUNTER — HEALTH MAINTENANCE LETTER (OUTPATIENT)
Age: 49
End: 2019-12-08

## 2019-12-16 ENCOUNTER — OFFICE VISIT (OUTPATIENT)
Dept: FAMILY MEDICINE | Facility: CLINIC | Age: 49
End: 2019-12-16
Payer: COMMERCIAL

## 2019-12-16 VITALS
WEIGHT: 194.8 LBS | HEART RATE: 79 BPM | HEIGHT: 76 IN | DIASTOLIC BLOOD PRESSURE: 74 MMHG | SYSTOLIC BLOOD PRESSURE: 126 MMHG | OXYGEN SATURATION: 100 % | BODY MASS INDEX: 23.72 KG/M2 | RESPIRATION RATE: 16 BRPM | TEMPERATURE: 97.4 F

## 2019-12-16 DIAGNOSIS — I10 HYPERTENSION GOAL BP (BLOOD PRESSURE) < 140/90: ICD-10-CM

## 2019-12-16 DIAGNOSIS — Z13.6 CARDIOVASCULAR SCREENING; LDL GOAL LESS THAN 130: ICD-10-CM

## 2019-12-16 DIAGNOSIS — Z00.00 ROUTINE GENERAL MEDICAL EXAMINATION AT A HEALTH CARE FACILITY: Primary | ICD-10-CM

## 2019-12-16 DIAGNOSIS — Z12.5 SCREENING FOR PROSTATE CANCER: ICD-10-CM

## 2019-12-16 LAB
ALBUMIN SERPL-MCNC: 3.7 G/DL (ref 3.4–5)
ALP SERPL-CCNC: 76 U/L (ref 40–150)
ALT SERPL W P-5'-P-CCNC: 35 U/L (ref 0–70)
ANION GAP SERPL CALCULATED.3IONS-SCNC: 2 MMOL/L (ref 3–14)
AST SERPL W P-5'-P-CCNC: 18 U/L (ref 0–45)
BILIRUB SERPL-MCNC: 1.2 MG/DL (ref 0.2–1.3)
BUN SERPL-MCNC: 15 MG/DL (ref 7–30)
CALCIUM SERPL-MCNC: 9 MG/DL (ref 8.5–10.1)
CHLORIDE SERPL-SCNC: 108 MMOL/L (ref 94–109)
CHOLEST SERPL-MCNC: 180 MG/DL
CO2 SERPL-SCNC: 29 MMOL/L (ref 20–32)
CREAT SERPL-MCNC: 1.08 MG/DL (ref 0.66–1.25)
GFR SERPL CREATININE-BSD FRML MDRD: 79 ML/MIN/{1.73_M2}
GLUCOSE SERPL-MCNC: 104 MG/DL (ref 70–99)
HDLC SERPL-MCNC: 103 MG/DL
LDLC SERPL CALC-MCNC: 59 MG/DL
NONHDLC SERPL-MCNC: 77 MG/DL
POTASSIUM SERPL-SCNC: 4.5 MMOL/L (ref 3.4–5.3)
PROT SERPL-MCNC: 7.4 G/DL (ref 6.8–8.8)
PSA SERPL-ACNC: 0.76 UG/L (ref 0–4)
SODIUM SERPL-SCNC: 139 MMOL/L (ref 133–144)
TRIGL SERPL-MCNC: 89 MG/DL

## 2019-12-16 PROCEDURE — 80053 COMPREHEN METABOLIC PANEL: CPT | Performed by: FAMILY MEDICINE

## 2019-12-16 PROCEDURE — 80061 LIPID PANEL: CPT | Performed by: FAMILY MEDICINE

## 2019-12-16 PROCEDURE — 99396 PREV VISIT EST AGE 40-64: CPT | Performed by: FAMILY MEDICINE

## 2019-12-16 PROCEDURE — G0103 PSA SCREENING: HCPCS | Performed by: FAMILY MEDICINE

## 2019-12-16 PROCEDURE — 36415 COLL VENOUS BLD VENIPUNCTURE: CPT | Performed by: FAMILY MEDICINE

## 2019-12-16 RX ORDER — AMLODIPINE BESYLATE 5 MG/1
5 TABLET ORAL DAILY
Qty: 90 TABLET | Refills: 3 | Status: SHIPPED | OUTPATIENT
Start: 2019-12-16 | End: 2020-12-24

## 2019-12-16 RX ORDER — ATORVASTATIN CALCIUM 40 MG/1
40 TABLET, FILM COATED ORAL DAILY
Qty: 90 TABLET | Refills: 3 | Status: SHIPPED | OUTPATIENT
Start: 2019-12-16 | End: 2020-12-24

## 2019-12-16 ASSESSMENT — ENCOUNTER SYMPTOMS
COUGH: 0
WEAKNESS: 0
HEARTBURN: 0
DIARRHEA: 0
SHORTNESS OF BREATH: 0
CHILLS: 0
DIZZINESS: 0
HEMATURIA: 0
HEADACHES: 0
HEMATOCHEZIA: 0
FEVER: 0
ABDOMINAL PAIN: 0
JOINT SWELLING: 1
FREQUENCY: 0
EYE PAIN: 0
NAUSEA: 0
PALPITATIONS: 0
DYSURIA: 0
CONSTIPATION: 0
ARTHRALGIAS: 0
MYALGIAS: 0
SORE THROAT: 0
PARESTHESIAS: 0
NERVOUS/ANXIOUS: 0

## 2019-12-16 ASSESSMENT — MIFFLIN-ST. JEOR: SCORE: 1840.11

## 2019-12-16 NOTE — PROGRESS NOTES
SUBJECTIVE:   CC: Cyrus Sanford is an 49 year old male who presents for preventative health visit.     Patient would like his prescriptions refilled, 90 day supply. He has no questions or concerns at this time. Declines all vaccinations.         Healthy Habits:     Getting at least 3 servings of Calcium per day:  NO    Bi-annual eye exam:  Yes    Dental care twice a year:  NO    Sleep apnea or symptoms of sleep apnea:  None    Diet:  Regular (no restrictions)    Frequency of exercise:  1 day/week    Duration of exercise:  15-30 minutes    Taking medications regularly:  Yes    Medication side effects:  None    PHQ-2 Total Score: 0    Additional concerns today:  No              Today's PHQ-2 Score:   PHQ-2 ( 1999 Pfizer) 12/16/2019   Q1: Little interest or pleasure in doing things 0   Q2: Feeling down, depressed or hopeless 0   PHQ-2 Score 0   Q1: Little interest or pleasure in doing things Not at all   Q2: Feeling down, depressed or hopeless Not at all   PHQ-2 Score 0       Abuse: Current or Past(Physical, Sexual or Emotional)- No  Do you feel safe in your environment? Yes        Social History     Tobacco Use     Smoking status: Former Smoker     Smokeless tobacco: Current User     Types: Chew     Tobacco comment: 1 tin every 3 days   Substance Use Topics     Alcohol use: Yes     Comment: 2 a day      If you drink alcohol do you typically have >3 drinks per day or >7 drinks per week? No    Alcohol Use 12/16/2019   Prescreen: >3 drinks/day or >7 drinks/week? No   No flowsheet data found.    Last PSA:   PSA   Date Value Ref Range Status   11/07/2018 0.76 0 - 4 ug/L Final     Comment:     Assay Method:  Chemiluminescence using Siemens Vista analyzer       Reviewed orders with patient. Reviewed health maintenance and updated orders accordingly - Yes      Reviewed and updated as needed this visit by clinical staff  Tobacco  Allergies  Meds  Med Hx  Surg Hx  Fam Hx  Soc Hx        Reviewed and updated as needed this  "visit by Provider        Past Medical History:   Diagnosis Date     Hypertension      Uncomplicated asthma      Unspecified asthma(493.90)     Asthma      Past Surgical History:   Procedure Laterality Date     COLONOSCOPY N/A 12/10/2018    Procedure: COLONOSCOPY;  Surgeon: Zoran Montgomery MD;  Location:  GI     HC VASECTOMY UNILAT/BILAT W POSTOP SEMEN  2000    Vasectomy     PHACOEMULSIFICATION WITH STANDARD INTRAOCULAR LENS IMPLANT Right 1/31/2019    Procedure: PHACOEMULSIFICATION WITH STANDARD INTRAOCULAR LENS IMPLANT RIGHT;  Surgeon: Loyd Tavarez MD;  Location:  OR       Review of Systems   Constitutional: Negative for chills and fever.   HENT: Negative for congestion, ear pain, hearing loss and sore throat.    Eyes: Negative for pain and visual disturbance.   Respiratory: Negative for cough and shortness of breath.    Cardiovascular: Negative for chest pain, palpitations and peripheral edema.   Gastrointestinal: Negative for abdominal pain, constipation, diarrhea, heartburn, hematochezia and nausea.   Genitourinary: Negative for discharge, dysuria, frequency, genital sores, hematuria, impotence and urgency.   Musculoskeletal: Positive for joint swelling. Negative for arthralgias and myalgias.   Skin: Negative for rash.   Neurological: Negative for dizziness, weakness, headaches and paresthesias.   Psychiatric/Behavioral: Negative for mood changes. The patient is not nervous/anxious.          OBJECTIVE:   /74 (BP Location: Right arm, Patient Position: Sitting, Cuff Size: Adult Regular)   Pulse 79   Temp 97.4  F (36.3  C) (Temporal)   Resp 16   Ht 1.93 m (6' 4\")   Wt 88.4 kg (194 lb 12.8 oz)   SpO2 100%   BMI 23.71 kg/m      Physical Exam  GENERAL: healthy, alert and no distress  EYES: Eyes grossly normal to inspection, PERRL and conjunctivae and sclerae normal  HENT: ear canals and TM's normal, nose and mouth without ulcers or lesions  NECK: no adenopathy, no asymmetry, masses, or " scars and thyroid normal to palpation  RESP: lungs clear to auscultation - no rales, rhonchi or wheezes  CV: regular rate and rhythm, normal S1 S2, no S3 or S4, no murmur, click or rub, no peripheral edema and peripheral pulses strong  ABDOMEN: soft, nontender, no hepatosplenomegaly, no masses and bowel sounds normal  MS: Right index finger after MCP joint was still little swollen from an injury.  He seems to have good range of motion full extension full flank flexion no real pain.  Still little stiff  SKIN: no suspicious lesions or rashes  NEURO: Normal strength and tone, mentation intact and speech normal  PSYCH: mentation appears normal, affect normal/bright    Diagnostic Test Results:  Labs reviewed in Epic  Results for orders placed or performed in visit on 12/16/19 (from the past 24 hour(s))   Lipid Profile   Result Value Ref Range    Cholesterol 180 <200 mg/dL    Triglycerides 89 <150 mg/dL    HDL Cholesterol 103 >39 mg/dL    LDL Cholesterol Calculated 59 <100 mg/dL    Non HDL Cholesterol 77 <130 mg/dL   Comprehensive metabolic panel   Result Value Ref Range    Sodium 139 133 - 144 mmol/L    Potassium 4.5 3.4 - 5.3 mmol/L    Chloride 108 94 - 109 mmol/L    Carbon Dioxide 29 20 - 32 mmol/L    Anion Gap 2 (L) 3 - 14 mmol/L    Glucose 104 (H) 70 - 99 mg/dL    Urea Nitrogen 15 7 - 30 mg/dL    Creatinine 1.08 0.66 - 1.25 mg/dL    GFR Estimate 79 >60 mL/min/[1.73_m2]    GFR Estimate If Black >90 >60 mL/min/[1.73_m2]    Calcium 9.0 8.5 - 10.1 mg/dL    Bilirubin Total 1.2 0.2 - 1.3 mg/dL    Albumin 3.7 3.4 - 5.0 g/dL    Protein Total 7.4 6.8 - 8.8 g/dL    Alkaline Phosphatase 76 40 - 150 U/L    ALT 35 0 - 70 U/L    AST 18 0 - 45 U/L   PSA, screen   Result Value Ref Range    PSA 0.76 0 - 4 ug/L       ASSESSMENT/PLAN:   1. Routine general medical examination at a health care facility  Generally healthy.  Reassured about the finger.  He has had x-ray done and was recently seen.  He does state it is getting a little  "better now.    2. Hypertension goal BP (blood pressure) < 140/90  Renewed his medicines.  Will notify with results.  - amLODIPine (NORVASC) 5 MG tablet; Take 1 tablet (5 mg) by mouth daily  Dispense: 90 tablet; Refill: 3  - atorvastatin (LIPITOR) 40 MG tablet; Take 1 tablet (40 mg) by mouth daily  Dispense: 90 tablet; Refill: 3  - Comprehensive metabolic panel    3. CARDIOVASCULAR SCREENING; LDL GOAL LESS THAN 130  He is on Lipitor for cholesterol will notify him with results renewed medicines.  - atorvastatin (LIPITOR) 40 MG tablet; Take 1 tablet (40 mg) by mouth daily  Dispense: 90 tablet; Refill: 3  - Lipid Profile    4. Screening for prostate cancer  We will notify.  - PSA, screen    COUNSELING:   Reviewed preventive health counseling, as reflected in patient instructions       Regular exercise       Healthy diet/nutrition    Estimated body mass index is 23.71 kg/m  as calculated from the following:    Height as of this encounter: 1.93 m (6' 4\").    Weight as of this encounter: 88.4 kg (194 lb 12.8 oz).          reports that he has quit smoking. His smokeless tobacco use includes chew.      Counseling Resources:  ATP IV Guidelines  Pooled Cohorts Equation Calculator  FRAX Risk Assessment  ICSI Preventive Guidelines  Dietary Guidelines for Americans, 2010  USDA's MyPlate  ASA Prophylaxis  Lung CA Screening    Richard Barroso MD  [facility]  "

## 2020-04-10 ENCOUNTER — MYC MEDICAL ADVICE (OUTPATIENT)
Dept: FAMILY MEDICINE | Facility: CLINIC | Age: 50
End: 2020-04-10

## 2020-04-13 ENCOUNTER — VIRTUAL VISIT (OUTPATIENT)
Dept: INTERNAL MEDICINE | Facility: CLINIC | Age: 50
End: 2020-04-13
Payer: COMMERCIAL
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
## 2020-04-13 DIAGNOSIS — S70.362A TICK BITE OF LEFT THIGH, INITIAL ENCOUNTER: Primary | ICD-10-CM

## 2020-04-13 PROCEDURE — 99213 OFFICE O/P EST LOW 20 MIN: CPT | Mod: TEL | Performed by: INTERNAL MEDICINE

## 2020-04-13 RX ORDER — DOXYCYCLINE 100 MG/1
100 CAPSULE ORAL 2 TIMES DAILY
Qty: 20 CAPSULE | Refills: 0 | Status: SHIPPED | OUTPATIENT
Start: 2020-04-13 | End: 2021-01-11

## 2020-04-13 ASSESSMENT — PAIN SCALES - GENERAL: PAINLEVEL: NO PAIN (0)

## 2020-04-13 NOTE — PROGRESS NOTES
"Cyrus Sanford is a 49 year old male who is being evaluated via a billable telephone visit.      The patient has been notified of following:     \"This telephone visit will be conducted via a call between you and your physician/provider. We have found that certain health care needs can be provided without the need for a physical exam.  This service lets us provide the care you need with a short phone conversation.  If a prescription is necessary we can send it directly to your pharmacy.  If lab work is needed we can place an order for that and you can then stop by our lab to have the test done at a later time.    Telephone visits are billed at different rates depending on your insurance coverage. During this emergency period, for some insurers they may be billed the same as an in-person visit.  Please reach out to your insurance provider with any questions.    If during the course of the call the physician/provider feels a telephone visit is not appropriate, you will not be charged for this service.\"    Patient has given verbal consent for Telephone visit?  Yes    How would you like to obtain your AVS? MyChart    Subjective     Cyrus Sanford is a 49 year old male who presents to clinic today for the following health issues:    Concern - tick bite  Onset: on 4/10/2020    Description:   Left inner thigh, right by groin    Intensity: mild    Progression of Symptoms:  same    Accompanying Signs & Symptoms:  Just has perfect red [place] around the bite. Scab is moist white. The [place] is about 1/2\" around. Denies fever and body aches    Previous history of similar problem:   no    Precipitating factors:   Worsened by: n/a    Alleviating factors:  Improved by: neosporin     Therapies Tried and outcome: neosporin     Tick bite left thigh, found it two days ago, maybe there a day or so.  Couldn't get the head out.  First had a red ring around it.  Now the area is just red.            Patient Active Problem List   Diagnosis "     Tenosynovitis of finger     CARDIOVASCULAR SCREENING; LDL GOAL LESS THAN 130     Hypertension goal BP (blood pressure) < 140/90     Cataract of right eye, unspecified cataract type     Past Surgical History:   Procedure Laterality Date     COLONOSCOPY N/A 12/10/2018    Procedure: COLONOSCOPY;  Surgeon: Zoran Montgomery MD;  Location:  GI     HC VASECTOMY UNILAT/BILAT W POSTOP SEMEN  2000    Vasectomy     PHACOEMULSIFICATION WITH STANDARD INTRAOCULAR LENS IMPLANT Right 1/31/2019    Procedure: PHACOEMULSIFICATION WITH STANDARD INTRAOCULAR LENS IMPLANT RIGHT;  Surgeon: Loyd Tavarez MD;  Location:  OR       Social History     Tobacco Use     Smoking status: Former Smoker     Smokeless tobacco: Current User     Types: Chew     Tobacco comment: 1 tin every 3 days   Substance Use Topics     Alcohol use: Yes     Comment: 2 a day      Family History   Problem Relation Age of Onset     Respiratory Father         Asthma     Cancer Sister          Current Outpatient Medications   Medication Sig Dispense Refill     amLODIPine (NORVASC) 5 MG tablet Take 1 tablet (5 mg) by mouth daily 90 tablet 3     atorvastatin (LIPITOR) 40 MG tablet Take 1 tablet (40 mg) by mouth daily 90 tablet 3     IBUPROFEN PO 4 tabs daily         Reviewed and updated as needed this visit by Provider         Review of Systems   ROS COMP: Constitutional, HEENT, cardiovascular, pulmonary, gi and gu systems are negative, except as otherwise noted.       Objective   Reported vitals:  There were no vitals taken for this visit.   healthy, alert and no distress  PSYCH: Alert and oriented times 3; coherent speech, normal   rate and volume, able to articulate logical thoughts, able   to abstract reason, no tangential thoughts, no hallucinations   or delusions  His affect is normal  RESP: No cough, no audible wheezing, able to talk in full sentences  Remainder of exam unable to be completed due to telephone visits             Assessment/Plan:  1. Tick bite of left thigh, initial encounter  Because is been on for over 24 hours and is been over 48 hours since he remove the tick I am worried that he could need more than just a prophylaxis dose in a treat him with doxycycline 100 mg twice a day for 10 days warned about sun sensitivity.  He also appears to have a mild cellulitis at the site and the head is still in there so I think the antibiotic will help cover him.  - doxycycline hyclate (VIBRAMYCIN) 100 MG capsule; Take 1 capsule (100 mg) by mouth 2 times daily  Dispense: 20 capsule; Refill: 0    No follow-ups on file.      Phone call duration:  5 minutes    Alexi Silva MD

## 2020-11-11 ENCOUNTER — VIRTUAL VISIT (OUTPATIENT)
Dept: FAMILY MEDICINE | Facility: OTHER | Age: 50
End: 2020-11-11

## 2020-11-11 NOTE — PROGRESS NOTES
"Date: 2020 15:44:35  Clinician: Melissa Doe  Clinician NPI: [id number]  Patient: mauricio barros  Patient : 1970  Patient Address: [address] [place] [address]  Patient Phone: [phone]  Visit Protocol: URI  Patient Summary:  mauricio is a 50 year old ( : 1970 ) male who initiated a [facility] Visit for COVID-19 (Coronavirus) evaluation and screening. When asked the question \"Please sign me up to receive news, health information and promotions from [facility].\", mauricio responded \"No\".    mauricio states his symptoms started 1-2 days ago.   His symptoms consist of rhinitis, myalgia, chills, malaise, ageusia, a headache, a cough, and nasal congestion. He is experiencing mild difficulty breathing with activities but can speak normally in full sentences. mauricio also feels feverish.   Symptom details     Nasal secretions: The color of his mucus is clear.    Cough: mauricio coughs every 5-10 minutes and his cough is not more bothersome at night. Phlegm comes into his throat when he coughs. He believes his cough is caused by post-nasal drip. The color of the phlegm is white.     Temperature: His current temperature is 98.1 degrees Fahrenheit.     Headache: He states the headache is mild (1-3 on a 10 point pain scale).      mauricio denies having vomiting, facial pain or pressure, sore throat, teeth pain, diarrhea, ear pain, wheezing, nausea, and anosmia. He also denies taking antibiotic medication in the past month, having recent facial or sinus surgery in the past 60 days, and having a sinus infection within the past year.   Precipitating events  He has not recently been exposed to someone with influenza. mauricio has not been in close contact with any high risk individuals.   Pertinent COVID-19 (Coronavirus) information  mauricio does not work or volunteer as healthcare worker or a . In the past 14 days, mauricio has not worked or volunteered at a healthcare facility or group living setting.   In the past 14 days, " he also has not lived in a congregate living setting.   mauricio has had a close contact with a laboratory-confirmed COVID-19 patient within 14 days of symptom onset. He was not exposed at his work. Date mauricio was exposed to the laboratory-confirmed COVID-19 patient: 11/05/2020   Additional information about contact with COVID-19 (Coronavirus) patient as reported by the patient (free text): Exposed at home to my partner    Since December 2019, mauricio has not been tested for COVID-19 and has not had upper respiratory infection or influenza-like illness.   Pertinent medical history  mauricio needs a return to work/school note.   Weight: 195 lbs   mauricio smokes or uses smokeless tobacco.   Weight: 195 lbs    MEDICATIONS: amlodipine oral, atorvastatin oral, ibuprofen oral, ALLERGIES: Zithromax Z-Steffen  Clinician Response:  Dear mauricio,  Based on the information provided, you have a viral upper respiratory infection, otherwise known as a cold. Symptoms vary from person to person, but can include sneezing, coughing, a runny nose, sore throat, and headache and range from mild to severe.  Unfortunately, there are no medications that can cure a cold, so treatment is focused on controlling symptoms as much as possible. Most people gradually feel better until symptoms are gone in 1-2 weeks.  Medication information  Because you have a viral infection, antibiotics will not help you get better. Treating a viral infection with antibiotics could actually make you feel worse.  Self care  Steps you can take to be as comfortable as possible:     Rest.    Drink plenty of fluids.    Take a warm shower to loosen congestion    Use a cool-mist humidifier.    Take a spoonful of honey to reduce your cough.     Also, as your provider, I need you to know that becoming tobacco-free is the most important thing you can do to protect your current and future health.  When to seek care  Please be seen in a clinic or urgent care if any of the following occur:   New  symptoms develop, or symptoms become worse   Call ahead before going to the clinic or urgent care.  Additional treatment plan   Your symptoms show that you may have coronavirus (COVID-19). This illness can cause fever, cough and trouble breathing. Many people get a mild case and get better on their own. Some people can get very sick.  Based on the symptoms you have shared, I would like you to be re-checked in 2 to 3 days. Please call your family clinic to set up a video or phone visit.  Will I be tested for COVID-19?  We would like to test you for this virus.   Please call [phone] to schedule your visit. Explain that you were referred by [facility] to have a COVID-19 test. Be ready to share your [facility] visit ID number.   * If you need to schedule in [place] or [facility] please call [phone] or for [facility] employees please call [phone].    The following will serve as your written order for this COVID Test, ordered by me, for the indication of suspected COVID [Z20.828]: The test will be ordered in [employer], our electronic health record, after you are scheduled. It will show as ordered and authorized by Vincenzo North MD.  Order: COVID-19 (Coronavirus) PCR for SYMPTOMATIC testing from [facility].   1.When it's time for your COVID test:   Stay at least 6 feet away from others. (If someone will drive you to your test, stay in the backseat, as far away from the  as you can.)   Cover your mouth and nose with a mask, tissue or washcloth.  Go straight to the testing site. Don't make any stops on the way there or back.      2.Starting now: Stay home and away from others (self-isolate) until:   You've had no fever---and no medicine that reduces fever---for one full day (24 hours). And...   Your other symptoms have gotten better. For example, your cough or breathing has improved. And...   At least 10 days have passed since your symptoms started.       During this time, don't leave the house except for testing or  "medical care.   Stay in your own room, even for meals. Use your own bathroom if you can.   Stay away from others in your home. No hugging, kissing or shaking hands. No visitors.  Don't go to work, school or anywhere else.    Clean \"high touch\" surfaces often (doorknobs, counters, handles, etc.). Use a household cleaning spray or wipes. You'll find a full list of  on the EPA website: www.epa.gov/pesticide-registration/list-n-disinfectants-use-against-sars-cov-2.   Cover your mouth and nose with a mask, tissue or washcloth to avoid spreading germs.  Wash your hands and face often. Use soap and water.  Caregivers in these groups are at risk for severe illness due to COVID-19:  o People 65 years and older  o People who live in a nursing home or long-term care facility  o People with chronic disease (lung, heart, cancer, diabetes, kidney, liver, immunologic)   o People who have a weakened immune system, including those who:   Are in cancer treatment  Take medicine that weakens the immune system, such as corticosteroids  Had a bone marrow or organ transplant  Have an immune deficiency  Have poorly controlled HIV or AIDS  Are obese (body mass index of 40 or higher)  Smoke regularly   o Caregivers should wear gloves while washing dishes, handling laundry and cleaning bedrooms and bathrooms.  o Use caution when washing and drying laundry: Don't shake dirty laundry, and use the warmest water setting that you can.  o For more tips, go to www.cdc.gov/coronavirus/2019-ncov/downloads/10Things.pdf.      How can I take care of myself?   Get lots of rest. Drink extra fluids (unless a doctor has told you not to)   Take Tylenol (acetaminophen) for fever or pain. If you have liver or kidney problems, ask your family doctor if it's okay to take Tylenol.   Adults can take either:    650 mg (two 325 mg pills) every 4 to 6 hours, or...   1,000 mg (two 500 mg pills) every 8 hours as needed.    Note: Don't take more than 3,000 mg in " one day. Acetaminophen is found in many medicines (both prescribed and over-the-counter medicines). Read all labels to be sure you don't take too much.   For children, check the Tylenol bottle for the right dose. The dose is based on the child's age or weight.    If you have other health problems (like cancer, heart failure, an organ transplant or severe kidney disease): Call your specialty clinic if you don't feel better in the next 2 days.       Know when to call 911. Emergency warning signs include:    Trouble breathing or shortness of breath Pain or pressure in the chest that doesn't go away Feeling confused like you haven't felt before, or not being able to wake up Bluish-colored lips or face  Where can I get more information?   [facility] -- About COVID-19: [email or web address]   CDC -- What to Do If You're Sick: www.cdc.gov/coronavirus/2019-ncov/about/steps-when-sick.html   CDC -- Ending Home Isolation: www.cdc.gov/coronavirus/2019-ncov/hcp/disposition-in-home-patients.html   CDC -- Caring for Someone: www.cdc.gov/coronavirus/2019-ncov/if-you-are-sick/care-for-someone.html   [facility] -- Interim Guidance for Hospital Discharge to Home: www.health.[facility].[place]/diseases/coronavirus/hcp/hospdischarge.pdf   [facility] clinical trials (COVID-19 research studies): clinicalaffairs.[facility]-clinical-trials    Below are the COVID-19 hotlines at the [facility] ([facility]). Interpreters are available.    For health questions: Call 583-682-6107 or 1-925.969.6326 (7 a.m. to 7 p.m.) For questions about schools and childcare: Call 026-299-4121 or 1-758.398.2500 (7 a.m. to 7 p.m.)       Diagnosis: Contact with and (suspected) exposure to other viral communicable diseases  Diagnosis ICD: Z20.828

## 2020-11-12 DIAGNOSIS — Z20.822 SUSPECTED COVID-19 VIRUS INFECTION: Primary | ICD-10-CM

## 2020-11-13 DIAGNOSIS — Z20.822 SUSPECTED 2019 NOVEL CORONAVIRUS INFECTION: Primary | ICD-10-CM

## 2020-11-13 DIAGNOSIS — Z20.822 SUSPECTED COVID-19 VIRUS INFECTION: ICD-10-CM

## 2020-11-13 PROCEDURE — U0003 INFECTIOUS AGENT DETECTION BY NUCLEIC ACID (DNA OR RNA); SEVERE ACUTE RESPIRATORY SYNDROME CORONAVIRUS 2 (SARS-COV-2) (CORONAVIRUS DISEASE [COVID-19]), AMPLIFIED PROBE TECHNIQUE, MAKING USE OF HIGH THROUGHPUT TECHNOLOGIES AS DESCRIBED BY CMS-2020-01-R: HCPCS | Performed by: FAMILY MEDICINE

## 2020-11-14 LAB
SARS-COV-2 RNA SPEC QL NAA+PROBE: ABNORMAL
SPECIMEN SOURCE: ABNORMAL

## 2020-12-23 DIAGNOSIS — Z13.6 CARDIOVASCULAR SCREENING; LDL GOAL LESS THAN 130: ICD-10-CM

## 2020-12-23 DIAGNOSIS — I10 HYPERTENSION GOAL BP (BLOOD PRESSURE) < 140/90: ICD-10-CM

## 2020-12-24 RX ORDER — AMLODIPINE BESYLATE 5 MG/1
TABLET ORAL
Qty: 90 TABLET | Refills: 0 | Status: SHIPPED | OUTPATIENT
Start: 2020-12-24 | End: 2021-03-29

## 2020-12-24 RX ORDER — ATORVASTATIN CALCIUM 40 MG/1
TABLET, FILM COATED ORAL
Qty: 90 TABLET | Refills: 0 | Status: SHIPPED | OUTPATIENT
Start: 2020-12-24 | End: 2021-03-29

## 2020-12-24 NOTE — TELEPHONE ENCOUNTER
Medication is being filled for 1 time refill only due to:  Patient needs to be seen because it has been more than one year since last visit.     Routing to team to set up appointment for patient.  Patient has been given #90 to get to appointment per triage protocol.    Gin Mills, SEANN, RN  [facility]

## 2020-12-31 ENCOUNTER — VIRTUAL VISIT (OUTPATIENT)
Dept: FAMILY MEDICINE | Facility: OTHER | Age: 50
End: 2020-12-31
Payer: COMMERCIAL

## 2020-12-31 DIAGNOSIS — H10.31 ACUTE CONJUNCTIVITIS OF RIGHT EYE, UNSPECIFIED ACUTE CONJUNCTIVITIS TYPE: Primary | ICD-10-CM

## 2020-12-31 PROCEDURE — 99213 OFFICE O/P EST LOW 20 MIN: CPT | Mod: TEL | Performed by: FAMILY MEDICINE

## 2020-12-31 RX ORDER — POLYMYXIN B SULFATE AND TRIMETHOPRIM 1; 10000 MG/ML; [USP'U]/ML
1-2 SOLUTION OPHTHALMIC EVERY 4 HOURS
Qty: 10 ML | Refills: 0 | Status: SHIPPED | OUTPATIENT
Start: 2020-12-31 | End: 2021-01-11

## 2020-12-31 NOTE — PROGRESS NOTES
"Cyrus Sanford is a 50 year old male who is being evaluated via a billable telephone visit.      The patient has been notified of following:     \"This telephone visit will be conducted via a call between you and your physician/provider. We have found that certain health care needs can be provided without the need for a physical exam.  This service lets us provide the care you need with a short phone conversation.  If a prescription is necessary we can send it directly to your pharmacy.  If lab work is needed we can place an order for that and you can then stop by our lab to have the test done at a later time.    Telephone visits are billed at different rates depending on your insurance coverage. During this emergency period, for some insurers they may be billed the same as an in-person visit.  Please reach out to your insurance provider with any questions.    If during the course of the call the physician/provider feels a telephone visit is not appropriate, you will not be charged for this service.\"    Patient has given verbal consent for Telephone visit?  Yes    What phone number would you like to be contacted at? [phone]    How would you like to obtain your AVS? MyChart    Subjective     Cyrus Sanford is a 50 year old male who presents via phone visit today for the following health issues:    HPI     Eye(s) Problem  Onset/Duration: this am   Description:   Location: right   Pain: no  Redness: YES  Accompanying Signs & Symptoms:  Discharge/mattering: YES  Swelling: no  Visual changes: no  Fever: no  Nasal Congestion: no  Bothered by bright lights: no  History:  Trauma: no  Foreign body exposure: no  Wearing contacts: no  Precipitating or alleviating factors: None  Therapies tried and outcome: None      Review of Systems   Constitutional, HEENT, cardiovascular, pulmonary, GI, , musculoskeletal, neuro, skin, endocrine and psych systems are negative, except as otherwise noted.       Objective    "       Vitals:  No vitals were obtained today due to virtual visit.    healthy, alert and no distress  PSYCH: Alert and oriented times 3; coherent speech, normal   rate and volume, able to articulate logical thoughts, able   to abstract reason, no tangential thoughts, no hallucinations   or delusions  His affect is normal  RESP: No cough, no audible wheezing, able to talk in full sentences  Remainder of exam unable to be completed due to telephone visits    Assessment/Plan:    1. Acute conjunctivitis of right eye, unspecified acute conjunctivitis type  Symptoms based on presentation concerning for acute conjunctivitis  Discussed home care  Reportable signs and symptoms discussed  RTC if symptoms persist or fail to improve    - trimethoprim-polymyxin b (POLYTRIM) 82886-9.1 UNIT/ML-% ophthalmic solution; Place 1-2 drops into the right eye every 4 hours  Dispense: 10 mL; Refill: 0      Phone call duration:  5 minutes

## 2021-01-08 ASSESSMENT — ENCOUNTER SYMPTOMS
HEMATURIA: 0
CHILLS: 0
DIZZINESS: 0
JOINT SWELLING: 0
DYSURIA: 0
HEMATOCHEZIA: 0
FREQUENCY: 0
PALPITATIONS: 0
ABDOMINAL PAIN: 0
SHORTNESS OF BREATH: 0
PARESTHESIAS: 0
COUGH: 0
HEARTBURN: 0
SORE THROAT: 0
WEAKNESS: 0
NERVOUS/ANXIOUS: 0
DIARRHEA: 0
HEADACHES: 0
NAUSEA: 0
CONSTIPATION: 0
FEVER: 0
MYALGIAS: 0
ARTHRALGIAS: 0
EYE PAIN: 0

## 2021-01-09 ENCOUNTER — HEALTH MAINTENANCE LETTER (OUTPATIENT)
Age: 51
End: 2021-01-09

## 2021-01-11 ENCOUNTER — OFFICE VISIT (OUTPATIENT)
Dept: FAMILY MEDICINE | Facility: CLINIC | Age: 51
End: 2021-01-11
Payer: COMMERCIAL

## 2021-01-11 VITALS
OXYGEN SATURATION: 99 % | HEART RATE: 92 BPM | RESPIRATION RATE: 16 BRPM | WEIGHT: 199.3 LBS | SYSTOLIC BLOOD PRESSURE: 138 MMHG | TEMPERATURE: 98.1 F | BODY MASS INDEX: 24.27 KG/M2 | HEIGHT: 76 IN | DIASTOLIC BLOOD PRESSURE: 92 MMHG

## 2021-01-11 DIAGNOSIS — E78.5 HYPERLIPIDEMIA LDL GOAL <130: ICD-10-CM

## 2021-01-11 DIAGNOSIS — Z12.5 SCREENING FOR PROSTATE CANCER: ICD-10-CM

## 2021-01-11 DIAGNOSIS — I10 HYPERTENSION GOAL BP (BLOOD PRESSURE) < 140/90: ICD-10-CM

## 2021-01-11 DIAGNOSIS — Z00.00 ROUTINE GENERAL MEDICAL EXAMINATION AT A HEALTH CARE FACILITY: Primary | ICD-10-CM

## 2021-01-11 LAB
ALBUMIN SERPL-MCNC: 3.7 G/DL (ref 3.4–5)
ALP SERPL-CCNC: 70 U/L (ref 40–150)
ALT SERPL W P-5'-P-CCNC: 41 U/L (ref 0–70)
ANION GAP SERPL CALCULATED.3IONS-SCNC: 4 MMOL/L (ref 3–14)
AST SERPL W P-5'-P-CCNC: 20 U/L (ref 0–45)
BILIRUB SERPL-MCNC: 1.3 MG/DL (ref 0.2–1.3)
BUN SERPL-MCNC: 18 MG/DL (ref 7–30)
CALCIUM SERPL-MCNC: 8.9 MG/DL (ref 8.5–10.1)
CHLORIDE SERPL-SCNC: 108 MMOL/L (ref 94–109)
CHOLEST SERPL-MCNC: 169 MG/DL
CO2 SERPL-SCNC: 28 MMOL/L (ref 20–32)
CREAT SERPL-MCNC: 1.05 MG/DL (ref 0.66–1.25)
GFR SERPL CREATININE-BSD FRML MDRD: 81 ML/MIN/{1.73_M2}
GLUCOSE SERPL-MCNC: 106 MG/DL (ref 70–99)
HDLC SERPL-MCNC: 83 MG/DL
LDLC SERPL CALC-MCNC: 65 MG/DL
NONHDLC SERPL-MCNC: 86 MG/DL
POTASSIUM SERPL-SCNC: 3.9 MMOL/L (ref 3.4–5.3)
PROT SERPL-MCNC: 7.3 G/DL (ref 6.8–8.8)
PSA SERPL-ACNC: 0.77 UG/L (ref 0–4)
SODIUM SERPL-SCNC: 140 MMOL/L (ref 133–144)
TRIGL SERPL-MCNC: 107 MG/DL

## 2021-01-11 PROCEDURE — 36415 COLL VENOUS BLD VENIPUNCTURE: CPT | Performed by: FAMILY MEDICINE

## 2021-01-11 PROCEDURE — 80061 LIPID PANEL: CPT | Performed by: FAMILY MEDICINE

## 2021-01-11 PROCEDURE — 99396 PREV VISIT EST AGE 40-64: CPT | Performed by: FAMILY MEDICINE

## 2021-01-11 PROCEDURE — G0103 PSA SCREENING: HCPCS | Performed by: FAMILY MEDICINE

## 2021-01-11 PROCEDURE — 80053 COMPREHEN METABOLIC PANEL: CPT | Performed by: FAMILY MEDICINE

## 2021-01-11 SDOH — HEALTH STABILITY: MENTAL HEALTH: HOW MANY STANDARD DRINKS CONTAINING ALCOHOL DO YOU HAVE ON A TYPICAL DAY?: 1 OR 2

## 2021-01-11 SDOH — HEALTH STABILITY: MENTAL HEALTH: HOW OFTEN DO YOU HAVE A DRINK CONTAINING ALCOHOL?: NOT ASKED

## 2021-01-11 SDOH — HEALTH STABILITY: MENTAL HEALTH: HOW OFTEN DO YOU HAVE 6 OR MORE DRINKS ON ONE OCCASION?: NOT ASKED

## 2021-01-11 ASSESSMENT — ENCOUNTER SYMPTOMS
HEARTBURN: 0
COUGH: 0
CHILLS: 0
FREQUENCY: 0
FEVER: 0
DYSURIA: 0
SORE THROAT: 0
ABDOMINAL PAIN: 0
HEMATOCHEZIA: 0
CONSTIPATION: 0
PARESTHESIAS: 0
JOINT SWELLING: 0
HEADACHES: 0
NAUSEA: 0
WEAKNESS: 0
ARTHRALGIAS: 0
PALPITATIONS: 0
NERVOUS/ANXIOUS: 0
SHORTNESS OF BREATH: 0
DIARRHEA: 0
MYALGIAS: 0
EYE PAIN: 0
DIZZINESS: 0
HEMATURIA: 0

## 2021-01-11 ASSESSMENT — PAIN SCALES - GENERAL: PAINLEVEL: NO PAIN (0)

## 2021-01-11 ASSESSMENT — MIFFLIN-ST. JEOR: SCORE: 1855.52

## 2021-01-11 NOTE — PROGRESS NOTES
SUBJECTIVE:   CC: Cyrus Sanford is an 50 year old male who presents for preventative health visit.     Patient has been advised of split billing requirements and indicates understanding: Yes  Healthy Habits:     Getting at least 3 servings of Calcium per day:  NO    Bi-annual eye exam:  Yes    Dental care twice a year:  NO    Sleep apnea or symptoms of sleep apnea:  None    Diet:  Regular (no restrictions)    Frequency of exercise:  2-3 days/week    Duration of exercise:  15-30 minutes    Taking medications regularly:  Yes    Medication side effects:  None    PHQ-2 Total Score: 0    Additional concerns today:  Yes          Today's PHQ-2 Score:   PHQ-2 ( 1999 Pfizer) 1/8/2021   Q1: Little interest or pleasure in doing things 0   Q2: Feeling down, depressed or hopeless 0   PHQ-2 Score 0   Q1: Little interest or pleasure in doing things Not at all   Q2: Feeling down, depressed or hopeless Not at all   PHQ-2 Score 0       Abuse: Current or Past(Physical, Sexual or Emotional)- No  Do you feel safe in your environment? Yes        Social History     Tobacco Use     Smoking status: Former Smoker     Smokeless tobacco: Current User     Types: Chew     Tobacco comment: 1 tin every 3 days   Substance Use Topics     Alcohol use: Yes     Drinks per session: 1 or 2     Comment: 2 a day      If you drink alcohol do you typically have >3 drinks per day or >7 drinks per week? Yes      Alcohol Use 1/8/2021   Prescreen: >3 drinks/day or >7 drinks/week? No   Prescreen: >3 drinks/day or >7 drinks/week? -   No flowsheet data found.    Last PSA:   PSA   Date Value Ref Range Status   12/16/2019 0.76 0 - 4 ug/L Final     Comment:     Assay Method:  Chemiluminescence using Siemens Vista analyzer       Reviewed orders with patient. Reviewed health maintenance and updated orders accordingly - Yes  Lab work is in process    Reviewed and updated as needed this visit by clinical staff  Tobacco  Allergies  Meds      Soc Hx        Reviewed and  "updated as needed this visit by Provider                    Review of Systems   Constitutional: Negative for chills and fever.   HENT: Negative for congestion, ear pain, hearing loss and sore throat.    Eyes: Negative for pain and visual disturbance.   Respiratory: Negative for cough and shortness of breath.    Cardiovascular: Negative for chest pain, palpitations and peripheral edema.   Gastrointestinal: Negative for abdominal pain, constipation, diarrhea, heartburn, hematochezia and nausea.   Genitourinary: Negative for discharge, dysuria, frequency, genital sores, hematuria, impotence and urgency.   Musculoskeletal: Negative for arthralgias, joint swelling and myalgias.   Skin: Negative for rash.   Neurological: Negative for dizziness, weakness, headaches and paresthesias.   Psychiatric/Behavioral: Negative for mood changes. The patient is not nervous/anxious.          OBJECTIVE:   BP (!) 144/102   Pulse 92   Temp 98.1  F (36.7  C) (Temporal)   Resp 16   Ht 1.93 m (6' 4\")   Wt 90.4 kg (199 lb 4.8 oz)   SpO2 99%   BMI 24.26 kg/m      Physical Exam  GENERAL: healthy, alert and no distress  EYES: Eyes grossly normal to inspection, PERRL and conjunctivae and sclerae normal  HENT: ear canals and TM's normal, nose and mouth without ulcers or lesions  NECK: no adenopathy, no asymmetry, masses, or scars and thyroid normal to palpation  RESP: lungs clear to auscultation - no rales, rhonchi or wheezes  CV: regular rate and rhythm, normal S1 S2, no S3 or S4, no murmur, click or rub, no peripheral edema and peripheral pulses strong  ABDOMEN: soft, nontender, no hepatosplenomegaly, no masses and bowel sounds normal  MS: no gross musculoskeletal defects noted, no edema  SKIN: no suspicious lesions or rashes  NEURO: Normal strength and tone, mentation intact and speech normal  PSYCH: mentation appears normal, affect normal/bright        ASSESSMENT/PLAN:   1. Routine general medical examination at a [facility]" "facility  No concerns had Covid back in November recovered well.    2. Hypertension goal BP (blood pressure) < 140/90  Take his blood pressure medicine this morning he is having no trouble may be a little bit of dependent edema in his ankles.  We will notify him with lab results and renew his medication for a year.  - Comprehensive metabolic panel    3. Hyperlipidemia LDL goal <130  We will notify with lab results and renew medication for a year.  - Lipid panel reflex to direct LDL Fasting    4. Screening for prostate cancer  We will notify.  - PSA, screen    Patient has been advised of split billing requirements and indicates understanding: Yes  COUNSELING:   Reviewed preventive health counseling, as reflected in patient instructions       Regular exercise       Healthy diet/nutrition       Vision screening    Estimated body mass index is 24.26 kg/m  as calculated from the following:    Height as of this encounter: 1.93 m (6' 4\").    Weight as of this encounter: 90.4 kg (199 lb 4.8 oz).         He reports that he has quit smoking. His smokeless tobacco use includes chew.      Counseling Resources:  ATP IV Guidelines  Pooled Cohorts Equation Calculator  FRAX Risk Assessment  ICSI Preventive Guidelines  Dietary Guidelines for Americans, 2010  USDA's MyPlate  ASA Prophylaxis  Lung CA Screening    Richard Barroso MD  [facility]  "

## 2021-01-11 NOTE — NURSING NOTE
Health Maintenance Due   Topic Date Due     HIV SCREENING  05/19/1983     HEPATITIS C SCREENING  05/19/1986     DTAP/TDAP/TD IMMUNIZATION (1 - Tdap) 05/19/1993     ZOSTER IMMUNIZATION (1 of 2) 05/19/2018     INFLUENZA VACCINE (1) 09/01/2020     PREVENTIVE CARE VISIT  12/16/2020     Health Maintenance reviewed at today's visit patient asked to schedule/complete:   Immunizations:  Patient declined     Hussein Renee, CMA

## 2021-10-11 NOTE — ANESTHESIA POSTPROCEDURE EVALUATION
Patient: Cyrus Sanford    Procedure(s):  PHACOEMULSIFICATION WITH STANDARD INTRAOCULAR LENS IMPLANT RIGHT    Diagnosis:NUCLEAR CATARACT  Diagnosis Additional Information: No value filed.    Anesthesia Type:  MAC    Note:  Anesthesia Post Evaluation    Patient location during evaluation: Phase 2 and Bedside  Patient participation: Able to fully participate in evaluation  Level of consciousness: awake  Pain management: adequate  Airway patency: patent  Cardiovascular status: acceptable and hemodynamically stable  Respiratory status: acceptable, room air and nonlabored ventilation  Hydration status: stable  PONV: none     Anesthetic complications: None    Comments: Patient was happy with the anesthesia care received and no anesthesia related complications were noted.  I will follow up with the patient again if it is needed.        Last vitals:  Vitals:    01/31/19 0949   BP: (!) 131/91   Resp: 16   Temp: 98.1  F (36.7  C)   SpO2: 100%         Electronically Signed By: SUDHA Alva CRNA  January 31, 2019  11:22 AM  
Satisfactory

## 2021-10-23 ENCOUNTER — HEALTH MAINTENANCE LETTER (OUTPATIENT)
Age: 51
End: 2021-10-23

## 2021-12-26 DIAGNOSIS — Z13.6 CARDIOVASCULAR SCREENING; LDL GOAL LESS THAN 130: ICD-10-CM

## 2021-12-26 DIAGNOSIS — I10 HYPERTENSION GOAL BP (BLOOD PRESSURE) < 140/90: ICD-10-CM

## 2021-12-27 NOTE — TELEPHONE ENCOUNTER
Routing refill request to provider for review/approval because:  Labs out of range:  BP    SEAN MunsonN, RN  [facility]

## 2021-12-29 RX ORDER — ATORVASTATIN CALCIUM 40 MG/1
TABLET, FILM COATED ORAL
Qty: 90 TABLET | Refills: 0 | Status: SHIPPED | OUTPATIENT
Start: 2021-12-29 | End: 2022-03-17

## 2021-12-29 RX ORDER — AMLODIPINE BESYLATE 5 MG/1
TABLET ORAL
Qty: 90 TABLET | Refills: 0 | Status: SHIPPED | OUTPATIENT
Start: 2021-12-29 | End: 2022-03-17

## 2022-02-12 ENCOUNTER — HEALTH MAINTENANCE LETTER (OUTPATIENT)
Age: 52
End: 2022-02-12

## 2022-03-17 ENCOUNTER — OFFICE VISIT (OUTPATIENT)
Dept: FAMILY MEDICINE | Facility: CLINIC | Age: 52
End: 2022-03-17
Payer: COMMERCIAL

## 2022-03-17 ENCOUNTER — HOSPITAL ENCOUNTER (OUTPATIENT)
Dept: GENERAL RADIOLOGY | Facility: CLINIC | Age: 52
Discharge: HOME | End: 2022-03-17
Attending: FAMILY MEDICINE | Admitting: FAMILY MEDICINE
Payer: COMMERCIAL

## 2022-03-17 VITALS
RESPIRATION RATE: 18 BRPM | DIASTOLIC BLOOD PRESSURE: 80 MMHG | OXYGEN SATURATION: 100 % | HEIGHT: 77 IN | TEMPERATURE: 97.4 F | WEIGHT: 212 LBS | SYSTOLIC BLOOD PRESSURE: 122 MMHG | BODY MASS INDEX: 25.03 KG/M2 | HEART RATE: 86 BPM

## 2022-03-17 DIAGNOSIS — M25.551 HIP PAIN, RIGHT: ICD-10-CM

## 2022-03-17 DIAGNOSIS — I10 HYPERTENSION GOAL BP (BLOOD PRESSURE) < 140/90: ICD-10-CM

## 2022-03-17 DIAGNOSIS — E78.5 HYPERLIPIDEMIA LDL GOAL <130: ICD-10-CM

## 2022-03-17 DIAGNOSIS — Z12.5 SCREENING FOR PROSTATE CANCER: ICD-10-CM

## 2022-03-17 DIAGNOSIS — Z00.01 ENCOUNTER FOR GENERAL ADULT MEDICAL EXAMINATION WITH ABNORMAL FINDINGS: Primary | ICD-10-CM

## 2022-03-17 DIAGNOSIS — K21.00 GASTROESOPHAGEAL REFLUX DISEASE WITH ESOPHAGITIS WITHOUT HEMORRHAGE: ICD-10-CM

## 2022-03-17 PROCEDURE — 99396 PREV VISIT EST AGE 40-64: CPT | Performed by: FAMILY MEDICINE

## 2022-03-17 PROCEDURE — 99214 OFFICE O/P EST MOD 30 MIN: CPT | Mod: 25 | Performed by: FAMILY MEDICINE

## 2022-03-17 PROCEDURE — 73502 X-RAY EXAM HIP UNI 2-3 VIEWS: CPT

## 2022-03-17 RX ORDER — AMLODIPINE BESYLATE 5 MG/1
TABLET ORAL
Qty: 90 TABLET | Refills: 3 | Status: SHIPPED | OUTPATIENT
Start: 2022-03-17 | End: 2023-03-24

## 2022-03-17 RX ORDER — ATORVASTATIN CALCIUM 40 MG/1
TABLET, FILM COATED ORAL
Qty: 90 TABLET | Refills: 3 | Status: SHIPPED | OUTPATIENT
Start: 2022-03-17 | End: 2023-04-03

## 2022-03-17 RX ORDER — MULTIVIT-MIN/IRON/FOLIC ACID/K 18-600-40
CAPSULE ORAL
COMMUNITY
End: 2024-04-09

## 2022-03-17 ASSESSMENT — ENCOUNTER SYMPTOMS
MYALGIAS: 0
SORE THROAT: 0
HEMATURIA: 0
HEMATOCHEZIA: 0
HEADACHES: 0
SHORTNESS OF BREATH: 0
COUGH: 0
CONSTIPATION: 0
PALPITATIONS: 0
JOINT SWELLING: 0
FREQUENCY: 0
EYE PAIN: 0
DIZZINESS: 0
NERVOUS/ANXIOUS: 0
FEVER: 0
WEAKNESS: 0
HEARTBURN: 1
DIARRHEA: 0
CHILLS: 0
DYSURIA: 0
ABDOMINAL PAIN: 0
ARTHRALGIAS: 1
NAUSEA: 0
PARESTHESIAS: 0

## 2022-03-17 ASSESSMENT — PAIN SCALES - GENERAL: PAINLEVEL: NO PAIN (0)

## 2022-03-17 NOTE — PROGRESS NOTES
SUBJECTIVE:   CC: Cyrus Sanford is an 51 year old male who presents for preventative health visit.       Patient has been advised of split billing requirements and indicates understanding: Yes  Healthy Habits:     Getting at least 3 servings of Calcium per day:  NO    Bi-annual eye exam:  NO    Dental care twice a year:  NO    Sleep apnea or symptoms of sleep apnea:  None    Diet:  Regular (no restrictions)    Frequency of exercise:  None    Taking medications regularly:  Yes    Medication side effects:  None    PHQ-2 Total Score: 0    Additional concerns today:  No  1) hip pain  2) heart burn      Today's PHQ-2 Score:   PHQ-2 ( 1999 Pfizer) 3/17/2022   Q1: Little interest or pleasure in doing things 0   Q2: Feeling down, depressed or hopeless 0   PHQ-2 Score 0   PHQ-2 Total Score (12-17 Years)- Positive if 3 or more points; Administer PHQ-A if positive -   Q1: Little interest or pleasure in doing things Not at all   Q2: Feeling down, depressed or hopeless Not at all   PHQ-2 Score 0       Abuse: Current or Past(Physical, Sexual or Emotional)- No  Do you feel safe in your environment? Yes    Have you ever done Advance Care Planning? (For example, a Health Directive, POLST, or a discussion with a medical provider or your loved ones about your wishes): No, advance care planning information given to patient to review.  Patient declined advance care planning discussion at this time.    Social History     Tobacco Use     Smoking status: Former Smoker     Smokeless tobacco: Current User     Types: Chew     Tobacco comment: 1 tin every 3 days   Substance Use Topics     Alcohol use: Yes     Comment: 2 a day          Alcohol Use 3/17/2022   Prescreen: >3 drinks/day or >7 drinks/week? No   Prescreen: >3 drinks/day or >7 drinks/week? -       Last PSA:   PSA   Date Value Ref Range Status   01/11/2021 0.77 0 - 4 ug/L Final     Comment:     Assay Method:  Chemiluminescence using Siemens Vista analyzer       Reviewed orders with  patient. Reviewed health maintenance and updated orders accordingly - Yes      Reviewed and updated as needed this visit by clinical staff                  Reviewed and updated as needed this visit by Provider                 Past Medical History:   Diagnosis Date     Hypertension      Uncomplicated asthma      Unspecified asthma(493.90)     Asthma      Past Surgical History:   Procedure Laterality Date     COLONOSCOPY N/A 12/10/2018    Procedure: COLONOSCOPY;  Surgeon: Zoran Montgomery MD;  Location:  GI     HC VASECTOMY UNILAT/BILAT W POSTOP SEMEN  2000    Vasectomy     PHACOEMULSIFICATION WITH STANDARD INTRAOCULAR LENS IMPLANT Right 1/31/2019    Procedure: PHACOEMULSIFICATION WITH STANDARD INTRAOCULAR LENS IMPLANT RIGHT;  Surgeon: Loyd Tavarez MD;  Location:  OR       Review of Systems   Constitutional: Negative for chills and fever.   HENT: Negative for congestion, ear pain, hearing loss and sore throat.    Eyes: Negative for pain and visual disturbance.   Respiratory: Negative for cough and shortness of breath.    Cardiovascular: Negative for chest pain, palpitations and peripheral edema.   Gastrointestinal: Positive for heartburn. Negative for abdominal pain, constipation, diarrhea, hematochezia and nausea.   Genitourinary: Negative for dysuria, frequency, genital sores, hematuria, impotence, penile discharge and urgency.   Musculoskeletal: Positive for arthralgias. Negative for joint swelling and myalgias.   Skin: Negative for rash.   Neurological: Negative for dizziness, weakness, headaches and paresthesias.   Psychiatric/Behavioral: Negative for mood changes. The patient is not nervous/anxious.          OBJECTIVE:   There were no vitals taken for this visit.    Physical Exam  GENERAL: healthy, alert and no distress  EYES: Eyes grossly normal to inspection, PERRL and conjunctivae and sclerae normal  HENT: ear canals and TM's normal, nose and mouth without ulcers or lesions  NECK: no  adenopathy, no asymmetry, masses, or scars and thyroid normal to palpation  RESP: lungs clear to auscultation - no rales, rhonchi or wheezes  CV: regular rate and rhythm, normal S1 S2, no S3 or S4, no murmur, click or rub, no peripheral edema and peripheral pulses strong  ABDOMEN: soft, nontender, no hepatosplenomegaly, no masses and bowel sounds normal  MS: Tender in the right inguinal region.  Certain movements external rotation does cause pain.  No tenderness over the greater trochanter.  SKIN: no suspicious lesions or rashes  NEURO: Normal strength and tone, mentation intact and speech normal  PSYCH: mentation appears normal, affect normal/bright    Diagnostic Test Results:  none     ASSESSMENT/PLAN:   (Z00.01) Encounter for general adult medical examination with abnormal findings  (primary encounter diagnosis)  Comment: Generally healthy quit chewing tobacco after 35 years this year.  Plan:     (M25.456) Hip pain, right  Comment: This is been going on for a while seems to be getting more irritating.  No previous injury.  Moves wrong in his sleep and he can have pain that will wake him up.  We will start with pelvis and right hip x-ray.  Will call and discuss results with him probably move onto an orthopedic consult depending on what this shows.  Plan: XR Pelvis and Hip Right 1 View            (K21.00) Gastroesophageal reflux disease with esophagitis without hemorrhage  Comment: This is started more so since he stopped chewing tobacco he has been chewing gum with  spearmint or peppermint he is cut back on that is helped some.  Denies any black stools.  No vomiting.  Discussed this with him we will set him up for EGD.  We want to rule out H. Pylori, hiatal hernia and/or Majano's.  He will continue on Pepcid for now.  Plan: Adult Gastro Ref - Procedure Only            (E78.5) Hyperlipidemia LDL goal <130  Comment: Renewed his medication.  We will notify with results of the lipid panel.  He will come in  "fasting.  Plan: Lipid panel reflex to direct LDL Fasting            (I10) Hypertension goal BP (blood pressure) < 140/90  Comment:  His medication is doing well on it.  We will notify with results of the chemistry panel.  He will come back fasting.  Plan: **Comprehensive metabolic panel FUTURE 2mo,         amLODIPine (NORVASC) 5 MG tablet, atorvastatin         (LIPITOR) 40 MG tablet            (Z12.5) Screening for prostate cancer  Comment:   Plan: PSA, screen              Patient has been advised of split billing requirements and indicates understanding: Yes    COUNSELING:   Reviewed preventive health counseling, as reflected in patient instructions       Regular exercise       Healthy diet/nutrition       Vision screening    Estimated body mass index is 24.26 kg/m  as calculated from the following:    Height as of 1/11/21: 1.93 m (6' 4\").    Weight as of 1/11/21: 90.4 kg (199 lb 4.8 oz).         He reports that he has quit smoking. His smokeless tobacco use includes chew.      Counseling Resources:  ATP IV Guidelines  Pooled Cohorts Equation Calculator  FRAX Risk Assessment  ICSI Preventive Guidelines  Dietary Guidelines for Americans, 2010  USDA's MyPlate  ASA Prophylaxis  Lung CA Screening    Richard Barroso MD  [facility]  "

## 2022-03-18 ENCOUNTER — TELEPHONE (OUTPATIENT)
Dept: GASTROENTEROLOGY | Facility: CLINIC | Age: 52
End: 2022-03-18
Payer: COMMERCIAL

## 2022-03-18 DIAGNOSIS — Z11.59 ENCOUNTER FOR SCREENING FOR OTHER VIRAL DISEASES: Primary | ICD-10-CM

## 2022-03-18 NOTE — TELEPHONE ENCOUNTER
Screening Questions  BlueKIND OF PREP RedLOCATION [review exclusion criteria] GreenSEDATION TYPE  1. Have you had a positive covid test in the last 90 days? N    2. Are you active on mychart? N    3. What insurance is in the chart? [employer]     3.   Ordering/Referring Provider: Richard Barroso MD     4. BMI 25.3 [BMI OVER 40-EXTENDED PREP]  If greater than 40 review exclusion criteria [PAC APPT IF @ [facility]]        5.  Respiratory Screening :  [If yes to any of the following HOSPITAL setting only]     Do you use daily home oxygen? N    Do you have mod to severe Obstructive Sleep Apnea? N  [OKAY @ [facility] SH PH RI]   Do you have Pulmonary Hypertension? N     Do you have UNCONTROLLED asthma? N        6.   Have you had a heart or lung transplant? N      7.   Are you currently on dialysis? N [ If yes, G-PREP & HOSPITAL setting only]     8.   Do you have chronic kidney disease? N [ If yes, G-PREP ]    9.   Have you had a stroke or Transient ischemic attack (TIA - aka  mini stroke ) within 6 months?  N (If yes, please review exclusion criteria)    10.   In the past 6 months, have you had any heart related issues including cardiomyopathy or heart attack? N           If yes, did it require cardiac stenting or other implantable device? NA      11.   Do you have any implantable devices in your body (pacemaker, defib, LVAD)? N (If yes, please review exclusion criteria)    12.   Do you take nitroglycerin? N           If yes, how often? NA  (if yes, HOSPITAL setting ONLY)    13.   Are you currently taking any blood thinners? N           [IF YES, INFORM PATIENT TO FOLLOW UP W/ ORDERING PROVIDER FOR BRIDGING INSTRUCTIONS]     14.   Do you have a diagnosis of diabetes? N   [ If yes, G-PREP ]    15.   [FEMALES] Are you currently pregnant? NA    If yes, how many weeks? NA    16.   Are you taking any prescription pain medications on a routine schedule?  N  [ If yes, EXTENDED PREP.] [If yes, MAC]    17.   Do you have any chemical  dependencies such as alcohol, street drugs, or methadone?  N [If yes, MAC]    18.   Do you have any history of post-traumatic stress syndrome, severe anxiety or history of psychosis?  N  [If yes, MAC]    19.   Do you have a significant intellectual disability?  N [If yes, MAC]    20.   Do you transfer independently?  Y    21.  On a regular basis do you go 3-5 days between bowel movements? N   [ If yes, EXTENDED PREP.]    22.   Preferred LOCAL Pharmacy for Pre Prescription       [facility] [address]      Scheduling Details      Caller : Cyrus Sanford  (Please ask for phone number if not scheduled by patient)    Type of Procedure Scheduled: EGD  Which Colonoscopy Prep was Sent?:   EVANGELIST CF PATIENTS & GROEN'S PATIENTS NEEDS EXTENDED PREP  Surgeon: RAOUL  Date of Procedure: 4/29  Location: [facility]      Sedation Type: CS  Conscious Sedation- Needs  for 6 hours after the procedure  MAC/General-Needs  for 24 hours after procedure    Pre-op Required at [facility], [place], [facility] and OR for MAC sedation: N  (advise patient they will need a pre-op prior to procedure -)      Informed patient they will need an adult  Y    Cannot take any type of public or medical transportation alone    Pre-Procedure Covid test to be completed at [facility] or Externally: 4/25 [facility]    Confirmed Nurse will call to complete assessment Y    Additional comments:

## 2022-03-25 ENCOUNTER — LAB (OUTPATIENT)
Dept: LAB | Facility: CLINIC | Age: 52
End: 2022-03-25
Payer: COMMERCIAL

## 2022-03-25 ENCOUNTER — TELEPHONE (OUTPATIENT)
Dept: FAMILY MEDICINE | Facility: CLINIC | Age: 52
End: 2022-03-25

## 2022-03-25 DIAGNOSIS — I10 HYPERTENSION GOAL BP (BLOOD PRESSURE) < 140/90: ICD-10-CM

## 2022-03-25 DIAGNOSIS — Z12.5 SCREENING FOR PROSTATE CANCER: ICD-10-CM

## 2022-03-25 DIAGNOSIS — M25.551 HIP PAIN, RIGHT: Primary | ICD-10-CM

## 2022-03-25 DIAGNOSIS — E78.5 HYPERLIPIDEMIA LDL GOAL <130: ICD-10-CM

## 2022-03-25 LAB
ALBUMIN SERPL-MCNC: 3.8 G/DL (ref 3.4–5)
ALP SERPL-CCNC: 76 U/L (ref 40–150)
ALT SERPL W P-5'-P-CCNC: 61 U/L (ref 0–70)
ANION GAP SERPL CALCULATED.3IONS-SCNC: 5 MMOL/L (ref 3–14)
AST SERPL W P-5'-P-CCNC: 33 U/L (ref 0–45)
BILIRUB SERPL-MCNC: 1 MG/DL (ref 0.2–1.3)
BUN SERPL-MCNC: 16 MG/DL (ref 7–30)
CALCIUM SERPL-MCNC: 9.2 MG/DL (ref 8.5–10.1)
CHLORIDE BLD-SCNC: 106 MMOL/L (ref 94–109)
CHOLEST SERPL-MCNC: 173 MG/DL
CO2 SERPL-SCNC: 27 MMOL/L (ref 20–32)
CREAT SERPL-MCNC: 1.15 MG/DL (ref 0.66–1.25)
FASTING STATUS PATIENT QL REPORTED: YES
GFR SERPL CREATININE-BSD FRML MDRD: 76 ML/MIN/1.73M2
GLUCOSE BLD-MCNC: 101 MG/DL (ref 70–99)
HDLC SERPL-MCNC: 75 MG/DL
LDLC SERPL CALC-MCNC: 78 MG/DL
NONHDLC SERPL-MCNC: 98 MG/DL
POTASSIUM BLD-SCNC: 4 MMOL/L (ref 3.4–5.3)
PROT SERPL-MCNC: 7.1 G/DL (ref 6.8–8.8)
PSA SERPL-MCNC: 0.7 UG/L (ref 0–4)
SODIUM SERPL-SCNC: 138 MMOL/L (ref 133–144)
TRIGL SERPL-MCNC: 102 MG/DL

## 2022-03-25 PROCEDURE — G0103 PSA SCREENING: HCPCS

## 2022-03-25 PROCEDURE — 80053 COMPREHEN METABOLIC PANEL: CPT

## 2022-03-25 PROCEDURE — 36415 COLL VENOUS BLD VENIPUNCTURE: CPT

## 2022-03-25 PROCEDURE — 80061 LIPID PANEL: CPT

## 2022-03-25 NOTE — TELEPHONE ENCOUNTER
Spoke with patient and informed of results below. Patient states that he has had this pain for awhile now and would like to see Ortho. Please sign orders.     Janiya Landa MA

## 2022-03-25 NOTE — TELEPHONE ENCOUNTER
----- Message from Richard Barroso MD sent at 3/25/2022  3:19 PM CDT -----  Let him know his hip x-ray shows some degenerative changes which is arthritis.  Also some in the lower lumbar spine.  Nothing here looks like a surgical situation but he could see orthopedics if pain is worsening or persist.

## 2022-04-18 ASSESSMENT — HOOS JR
SITTING: MILD
HOOS JR TOTAL INTERVAL SCORE: 52.97
RISING FROM SITTING: SEVERE
WALKING ON UNEVEN SURFACE: MODERATE
LYING IN BED (TURNING OVER, MAINTAINING HIP POSITION): MODERATE
BENDING TO THE FLOOR TO PICK UP OBJECT: SEVERE
GOING UP OR DOWN STAIRS: MILD

## 2022-04-20 ENCOUNTER — OFFICE VISIT (OUTPATIENT)
Dept: ORTHOPEDICS | Facility: CLINIC | Age: 52
End: 2022-04-20
Attending: FAMILY MEDICINE
Payer: COMMERCIAL

## 2022-04-20 VITALS
HEIGHT: 76 IN | HEART RATE: 101 BPM | SYSTOLIC BLOOD PRESSURE: 147 MMHG | DIASTOLIC BLOOD PRESSURE: 95 MMHG | RESPIRATION RATE: 20 BRPM | BODY MASS INDEX: 26.18 KG/M2 | WEIGHT: 215 LBS

## 2022-04-20 DIAGNOSIS — M25.551 HIP PAIN, RIGHT: ICD-10-CM

## 2022-04-20 DIAGNOSIS — M16.11 PRIMARY OSTEOARTHRITIS OF RIGHT HIP: ICD-10-CM

## 2022-04-20 PROCEDURE — 99203 OFFICE O/P NEW LOW 30 MIN: CPT | Performed by: ORTHOPAEDIC SURGERY

## 2022-04-20 NOTE — PATIENT INSTRUCTIONS
Ycrus to follow up with Primary Care provider regarding elevated blood pressure.    Diagnosis:  right hip osteoarthritis - early.  Stay light.  Consider cane or walking stick in left hand.  Carry any weights with right hand.  Aleve up to 4 tablets per day or ibuprofen up to 12 tablets per day.   Consider injection in future.  Long future is total hip arthroplasty.

## 2022-04-20 NOTE — LETTER
4/20/2022         RE: Cyrus Sanford  [address] [place] [id number]        Dear Colleague,    Thank you for referring your patient, Cyrus Sanford, to the [facility]. Please see a copy of my visit note below.    Cyrus Sanford is a 51 year old male who is seen in consultation at the request of Dr. Richard Barroso  for right hip pain.  He has had pain for roughly 6 months.  He has pain in the groin and lateral hip.  He has had no history of injury.  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  He describes episodic sharp shooting pains with more of a chronic dull ache.  He has occasional night pain.  Rates his pain up to 3 out of 10.  He has had no specific treatments.    X-ray from 3/17/22 shows moderate right hip osteoarthritis.  Possible bilateral femoral acetabular impingement.    Past Medical History:   Diagnosis Date     Hypertension      Primary osteoarthritis of right hip 4/23/2022     Uncomplicated asthma      Unspecified asthma(493.90)     Asthma       Past Surgical History:   Procedure Laterality Date     COLONOSCOPY N/A 12/10/2018    Procedure: COLONOSCOPY;  Surgeon: Zoran Montgomery MD;  [facility]     HC VASECTOMY UNILAT/BILAT W POSTOP SEMEN  2000    Vasectomy     PHACOEMULSIFICATION WITH STANDARD INTRAOCULAR LENS IMPLANT Right 1/31/2019    Procedure: PHACOEMULSIFICATION WITH STANDARD INTRAOCULAR LENS IMPLANT RIGHT;  Surgeon: Loyd Tavarez MD;  [facility]       Family History   Problem Relation Age of Onset     Respiratory Father         Asthma     Cancer Sister        Social History     Socioeconomic History     Marital status:      Spouse name: Not on file     Number of children: Not on file     Years of education: Not on file     Highest education level: Not on file   Occupational History     Not on file   Tobacco Use     Smoking status: Former Smoker     Smokeless tobacco: Current User     Types: Chew     Tobacco  comment: 1 tin every 3 days   Substance and Sexual Activity     Alcohol use: Yes     Comment: 2 a day      Drug use: No     Sexual activity: Yes     Partners: Female   Other Topics Concern      Service No     Blood Transfusions No     Caffeine Concern No     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 Not Asked   Social History Narrative     Not on file     Social Determinants of Health     Financial Resource Strain: Not on file   Food Insecurity: Not on file   Transportation Needs: Not on file   Physical Activity: Not on file   Stress: Not on file   Social Connections: Not on file   Intimate Partner Violence: Not on file   Housing Stability: Not on file       Current Outpatient Medications   Medication Sig Dispense Refill     amLODIPine (NORVASC) 5 MG tablet TAKE 1 TABLET BY MOUTH ONCE DAILY NEEDS  OFFICE  VISIT  FOR  ANY  FURTHER  REFILLS 90 tablet 3     atorvastatin (LIPITOR) 40 MG tablet TAKE 1 TABLET BY MOUTH ONCE DAILY NEEDS  OFFICE  VISIT  FOR  ANY  FURTHER  REFILLS 90 tablet 3     Multiple Vitamin (MULTIVITAMIN ADULT PO)        Vitamin D, Cholecalciferol, 25 MCG (1000 UT) TABS          Allergies   Allergen Reactions     No Known Allergies        REVIEW OF SYSTEMS:  CONSTITUTIONAL:  NEGATIVE for fever, chills, change in weight, not feeling tired  SKIN:  NEGATIVE for worrisome rashes, no skin lumps, no skin ulcers and no non-healing wounds  EYES:  NEGATIVE for vision changes or irritation.  ENT/MOUTH:  NEGATIVE.  No hearing loss, no hoarseness, no difficulty swallowing.  RESP:  NEGATIVE. No cough or shortness of breath.  CV:  NEGATIVE for chest pain, palpitations or peripheral edema  GI:  NEGATIVE for nausea, abdominal  "pain, heartburn, or change in bowel habits  :  Negative. No dysuria, no hematuria  MUSCULOSKELETAL:  See HPI above  NEURO:  NEGATIVE . No headaches, no dizziness,  no numbness  ENDOCRINE:  NEGATIVE for temperature intolerance, skin/hair changes  HEME/ALLERGY/IMMUNE:  NEGATIVE for bleeding problems  PSYCHIATRIC:  NEGATIVE. no anxiety, no depression.     Exam:  Vitals: BP (!) 147/95   Pulse 101   Resp 20   Ht 1.93 m (6' 4\")   Wt 97.5 kg (215 lb)   BMI 26.17 kg/m    BMI= Body mass index is 26.17 kg/m .  Constitutional:  healthy, alert and no distress  Neuro: Alert and Oriented x 3, no focal defects.  Psych: Affect normal   Respiratory: Breathing not labored.  Cardiovascular: normal peripheral pulses  Lymph: no adenopathy  Skin: No rashes,worrisome lesions or skin problems  He has no tenderness across the low back or SI joints.  There is no tenderness at the greater trochanters.  He does have some pain with rotation of the right hip and has mildly limited motion.  Right hip external rotation is 45 degrees, internal rotation is 30 degrees.  Left hip external rotation is 50 degrees, internal rotation is 45 degrees.  He has no flexion contractures.    He has no increased pain bringing his legs across his body.  Sensation, motor and circulation are intact.    Assessment:  Right hip osteoarthritis - moderate.  Plan:  Stay light.  Consider cane or walking stick in left hand.  Carry any weights with right hand.  Aleve up to 4 tablets per day or ibuprofen up to 12 tablets per day.   Consider injection in future.    Long future is total hip arthroplasty.      Again, thank you for allowing me to participate in the care of your patient.        Sincerely,        Lionel Zapata MD    "

## 2022-04-23 PROBLEM — M16.11 PRIMARY OSTEOARTHRITIS OF RIGHT HIP: Status: ACTIVE | Noted: 2022-04-23

## 2022-04-23 NOTE — PROGRESS NOTES
Cyrus Sanford is a 51 year old male who is seen in consultation at the request of Dr. Richard Barroso  for right hip pain.  He has had pain for roughly 6 months.  He has pain in the groin and lateral hip.  He has had no history of injury.  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  He describes episodic sharp shooting pains with more of a chronic dull ache.  He has occasional night pain.  Rates his pain up to 3 out of 10.  He has had no specific treatments.    X-ray from 3/17/22 shows moderate right hip osteoarthritis.  Possible bilateral femoral acetabular impingement.    Past Medical History:   Diagnosis Date     Hypertension      Primary osteoarthritis of right hip 4/23/2022     Uncomplicated asthma      Unspecified asthma(493.90)     Asthma       Past Surgical History:   Procedure Laterality Date     COLONOSCOPY N/A 12/10/2018    Procedure: COLONOSCOPY;  Surgeon: Zoran Montgomery MD;  [facility]     HC VASECTOMY UNILAT/BILAT W POSTOP SEMEN  2000    Vasectomy     PHACOEMULSIFICATION WITH STANDARD INTRAOCULAR LENS IMPLANT Right 1/31/2019    Procedure: PHACOEMULSIFICATION WITH STANDARD INTRAOCULAR LENS IMPLANT RIGHT;  Surgeon: Loyd Tavarez MD;  [facility]       Family History   Problem Relation Age of Onset     Respiratory Father         Asthma     Cancer Sister        Social History     Socioeconomic History     Marital status:      Spouse name: Not on file     Number of children: Not on file     Years of education: Not on file     Highest education level: Not on file   Occupational History     Not on file   Tobacco Use     Smoking status: Former Smoker     Smokeless tobacco: Current User     Types: Chew     Tobacco comment: 1 tin every 3 days   Substance and Sexual Activity     Alcohol use: Yes     Comment: 2 a day      Drug use: No     Sexual activity: Yes     Partners: Female   Other Topics Concern      Service No     Blood Transfusions No     Caffeine  Concern No     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 Not Asked   Social History Narrative     Not on file     Social Determinants of Health     Financial Resource Strain: Not on file   Food Insecurity: Not on file   Transportation Needs: Not on file   Physical Activity: Not on file   Stress: Not on file   Social Connections: Not on file   Intimate Partner Violence: Not on file   Housing Stability: Not on file       Current Outpatient Medications   Medication Sig Dispense Refill     amLODIPine (NORVASC) 5 MG tablet TAKE 1 TABLET BY MOUTH ONCE DAILY NEEDS  OFFICE  VISIT  FOR  ANY  FURTHER  REFILLS 90 tablet 3     atorvastatin (LIPITOR) 40 MG tablet TAKE 1 TABLET BY MOUTH ONCE DAILY NEEDS  OFFICE  VISIT  FOR  ANY  FURTHER  REFILLS 90 tablet 3     Multiple Vitamin (MULTIVITAMIN ADULT PO)        Vitamin D, Cholecalciferol, 25 MCG (1000 UT) TABS          Allergies   Allergen Reactions     No Known Allergies        REVIEW OF SYSTEMS:  CONSTITUTIONAL:  NEGATIVE for fever, chills, change in weight, not feeling tired  SKIN:  NEGATIVE for worrisome rashes, no skin lumps, no skin ulcers and no non-healing wounds  EYES:  NEGATIVE for vision changes or irritation.  ENT/MOUTH:  NEGATIVE.  No hearing loss, no hoarseness, no difficulty swallowing.  RESP:  NEGATIVE. No cough or shortness of breath.  CV:  NEGATIVE for chest pain, palpitations or peripheral edema  GI:  NEGATIVE for nausea, abdominal pain, heartburn, or change in bowel habits  :  Negative. No dysuria, no hematuria  MUSCULOSKELETAL:  See HPI above  NEURO:  NEGATIVE . No headaches, no dizziness,  no numbness  ENDOCRINE:  NEGATIVE for temperature intolerance, skin/hair  "changes  HEME/ALLERGY/IMMUNE:  NEGATIVE for bleeding problems  PSYCHIATRIC:  NEGATIVE. no anxiety, no depression.     Exam:  Vitals: BP (!) 147/95   Pulse 101   Resp 20   Ht 1.93 m (6' 4\")   Wt 97.5 kg (215 lb)   BMI 26.17 kg/m    BMI= Body mass index is 26.17 kg/m .  Constitutional:  healthy, alert and no distress  Neuro: Alert and Oriented x 3, no focal defects.  Psych: Affect normal   Respiratory: Breathing not labored.  Cardiovascular: normal peripheral pulses  Lymph: no adenopathy  Skin: No rashes,worrisome lesions or skin problems  He has no tenderness across the low back or SI joints.  There is no tenderness at the greater trochanters.  He does have some pain with rotation of the right hip and has mildly limited motion.  Right hip external rotation is 45 degrees, internal rotation is 30 degrees.  Left hip external rotation is 50 degrees, internal rotation is 45 degrees.  He has no flexion contractures.    He has no increased pain bringing his legs across his body.  Sensation, motor and circulation are intact.    Assessment:  Right hip osteoarthritis - moderate.  Plan:  Stay light.  Consider cane or walking stick in left hand.  Carry any weights with right hand.  Aleve up to 4 tablets per day or ibuprofen up to 12 tablets per day.   Consider injection in future.    Long future is total hip arthroplasty.  "

## 2022-04-25 ENCOUNTER — LAB (OUTPATIENT)
Dept: LAB | Facility: CLINIC | Age: 52
End: 2022-04-25
Payer: COMMERCIAL

## 2022-04-25 DIAGNOSIS — Z11.59 ENCOUNTER FOR SCREENING FOR OTHER VIRAL DISEASES: ICD-10-CM

## 2022-04-25 PROCEDURE — U0003 INFECTIOUS AGENT DETECTION BY NUCLEIC ACID (DNA OR RNA); SEVERE ACUTE RESPIRATORY SYNDROME CORONAVIRUS 2 (SARS-COV-2) (CORONAVIRUS DISEASE [COVID-19]), AMPLIFIED PROBE TECHNIQUE, MAKING USE OF HIGH THROUGHPUT TECHNOLOGIES AS DESCRIBED BY CMS-2020-01-R: HCPCS

## 2022-04-25 PROCEDURE — U0005 INFEC AGEN DETEC AMPLI PROBE: HCPCS

## 2022-04-26 LAB — SARS-COV-2 RNA RESP QL NAA+PROBE: NEGATIVE

## 2022-04-28 ENCOUNTER — ANESTHESIA EVENT (OUTPATIENT)
Dept: GASTROENTEROLOGY | Facility: CLINIC | Age: 52
End: 2022-04-28
Payer: COMMERCIAL

## 2022-04-28 ASSESSMENT — LIFESTYLE VARIABLES: TOBACCO_USE: 1

## 2022-04-28 NOTE — ANESTHESIA PREPROCEDURE EVALUATION
Anesthesia Pre-Procedure Evaluation    Patient: Cyrsu Sanford   MRN: [id number] : 1970        Procedure : Procedure(s):  ESOPHAGOGASTRODUODENOSCOPY (EGD)          Past Medical History:   Diagnosis Date     Hypertension      Primary osteoarthritis of right hip 2022     Uncomplicated asthma      Unspecified asthma(493.90)     Asthma      Past Surgical History:   Procedure Laterality Date     COLONOSCOPY N/A 12/10/2018    Procedure: COLONOSCOPY;  Surgeon: Zoran Montgomery MD;  [facility]     HC VASECTOMY UNILAT/BILAT W POSTOP SEMEN  2000    Vasectomy     PHACOEMULSIFICATION WITH STANDARD INTRAOCULAR LENS IMPLANT Right 2019    Procedure: PHACOEMULSIFICATION WITH STANDARD INTRAOCULAR LENS IMPLANT RIGHT;  Surgeon: Loyd aTvarez MD;  [facility]      Allergies   Allergen Reactions     No Known Allergies       Social History     Tobacco Use     Smoking status: Former Smoker     Smokeless tobacco: Current User     Types: Chew     Tobacco comment: 1 tin every 3 days   Substance Use Topics     Alcohol use: Yes     Comment: 2 a day       Wt Readings from Last 1 Encounters:   22 97.5 kg (215 lb)        Anesthesia Evaluation   Pt has had prior anesthetic. Type: MAC.    No history of anesthetic complications       ROS/MED HX  ENT/Pulmonary:     (+) tobacco use, Past use, asthma     Neurologic:  - neg neurologic ROS     Cardiovascular:     (+) Dyslipidemia hypertension-range: < 140/90/ ----    METS/Exercise Tolerance:     Hematologic:  - neg hematologic  ROS     Musculoskeletal: Comment: Primary osteoarthritis of right hip  (+) arthritis,     GI/Hepatic:  - neg GI/hepatic ROS     Renal/Genitourinary:  - neg Renal ROS     Endo:  - neg endo ROS     Psychiatric/Substance Use:  - neg psychiatric ROS     Infectious Disease:  - neg infectious disease ROS     Malignancy:  - neg malignancy ROS     Other:            Physical Exam    Airway  airway exam normal      Mallampati: II   TM  distance: > 3 FB   Neck ROM: full   Mouth opening: > 3 cm    Respiratory Devices and Support         Dental  no notable dental history         Cardiovascular   cardiovascular exam normal       Rhythm and rate: regular and normal     Pulmonary   pulmonary exam normal        breath sounds clear to auscultation           OUTSIDE LABS:  CBC:   Lab Results   Component Value Date    WBC 5.1 11/07/2018    WBC 2.8 (L) 03/09/2016    HGB 15.5 11/07/2018    HGB 16.2 03/09/2016    HCT 47.9 11/07/2018    HCT 47.8 03/09/2016     11/07/2018     03/09/2016     BMP:   Lab Results   Component Value Date     03/25/2022     01/11/2021    POTASSIUM 4.0 03/25/2022    POTASSIUM 3.9 01/11/2021    CHLORIDE 106 03/25/2022    CHLORIDE 108 01/11/2021    CO2 27 03/25/2022    CO2 28 01/11/2021    BUN 16 03/25/2022    BUN 18 01/11/2021    CR 1.15 03/25/2022    CR 1.05 01/11/2021     (H) 03/25/2022     (H) 01/11/2021     COAGS: No results found for: PTT, INR, FIBR  POC: No results found for: BGM, HCG, HCGS  HEPATIC:   Lab Results   Component Value Date    ALBUMIN 3.8 03/25/2022    PROTTOTAL 7.1 03/25/2022    ALT 61 03/25/2022    AST 33 03/25/2022    ALKPHOS 76 03/25/2022    BILITOTAL 1.0 03/25/2022     OTHER:   Lab Results   Component Value Date    ELISE 9.2 03/25/2022    TSH 1.60 03/21/2015       Anesthesia Plan    ASA Status:  2   NPO Status:  NPO Appropriate    Anesthesia Type: MAC.     - Reason for MAC: straight local not clinically adequate   Induction: Intravenous, Propofol.   Maintenance: TIVA.        Consents    Anesthesia Plan(s) and associated risks, benefits, and realistic alternatives discussed. Questions answered and patient/representative(s) expressed understanding.    - Discussed:     - Discussed with:  Patient      - Extended Intubation/Ventilatory Support Discussed: No.      - Patient is DNR/DNI Status: No    Use of blood products discussed: No .     Postoperative Care    Pain management: IV  analgesics.   PONV prophylaxis: Ondansetron (or other 5HT-3), Dexamethasone or Solumedrol     Comments:    Other Comments: The risks and benefits of anesthesia, and the alternatives where applicable, have been discussed with the patient, and they wish to proceed.            SUDHA Power CRNA

## 2022-04-29 ENCOUNTER — ANESTHESIA (OUTPATIENT)
Dept: GASTROENTEROLOGY | Facility: CLINIC | Age: 52
End: 2022-04-29
Payer: COMMERCIAL

## 2022-04-29 ENCOUNTER — HOSPITAL ENCOUNTER (OUTPATIENT)
Facility: CLINIC | Age: 52
Discharge: HOME | End: 2022-04-29
Attending: SURGERY | Admitting: SURGERY
Payer: COMMERCIAL

## 2022-04-29 VITALS
OXYGEN SATURATION: 95 % | SYSTOLIC BLOOD PRESSURE: 128 MMHG | TEMPERATURE: 97.7 F | HEART RATE: 56 BPM | RESPIRATION RATE: 16 BRPM | DIASTOLIC BLOOD PRESSURE: 98 MMHG

## 2022-04-29 LAB — UPPER GI ENDOSCOPY: NORMAL

## 2022-04-29 PROCEDURE — 250N000011 HC RX IP 250 OP 636: Performed by: NURSE ANESTHETIST, CERTIFIED REGISTERED

## 2022-04-29 PROCEDURE — 43239 EGD BIOPSY SINGLE/MULTIPLE: CPT | Performed by: SURGERY

## 2022-04-29 PROCEDURE — 370N000017 HC ANESTHESIA TECHNICAL FEE, PER MIN: Performed by: SURGERY

## 2022-04-29 PROCEDURE — 250N000009 HC RX 250: Performed by: NURSE ANESTHETIST, CERTIFIED REGISTERED

## 2022-04-29 PROCEDURE — 258N000003 HC RX IP 258 OP 636: Performed by: NURSE ANESTHETIST, CERTIFIED REGISTERED

## 2022-04-29 PROCEDURE — 88305 TISSUE EXAM BY PATHOLOGIST: CPT | Mod: TC | Performed by: SURGERY

## 2022-04-29 RX ORDER — ONDANSETRON 2 MG/ML
4 INJECTION INTRAMUSCULAR; INTRAVENOUS
Status: DISCONTINUED | OUTPATIENT
Start: 2022-04-29 | End: 2022-04-29 | Stop reason: HOSPADM

## 2022-04-29 RX ORDER — ONDANSETRON 2 MG/ML
4 INJECTION INTRAMUSCULAR; INTRAVENOUS EVERY 6 HOURS PRN
Status: DISCONTINUED | OUTPATIENT
Start: 2022-04-29 | End: 2022-04-29 | Stop reason: HOSPADM

## 2022-04-29 RX ORDER — LIDOCAINE 40 MG/G
CREAM TOPICAL
Status: DISCONTINUED | OUTPATIENT
Start: 2022-04-29 | End: 2022-04-29 | Stop reason: HOSPADM

## 2022-04-29 RX ORDER — PROCHLORPERAZINE MALEATE 5 MG
10 TABLET ORAL EVERY 6 HOURS PRN
Status: DISCONTINUED | OUTPATIENT
Start: 2022-04-29 | End: 2022-04-29 | Stop reason: HOSPADM

## 2022-04-29 RX ORDER — PROPOFOL 10 MG/ML
INJECTION, EMULSION INTRAVENOUS CONTINUOUS PRN
Status: DISCONTINUED | OUTPATIENT
Start: 2022-04-29 | End: 2022-04-29

## 2022-04-29 RX ORDER — LIDOCAINE HYDROCHLORIDE 20 MG/ML
INJECTION, SOLUTION INFILTRATION; PERINEURAL PRN
Status: DISCONTINUED | OUTPATIENT
Start: 2022-04-29 | End: 2022-04-29

## 2022-04-29 RX ORDER — NALOXONE HYDROCHLORIDE 0.4 MG/ML
0.2 INJECTION, SOLUTION INTRAMUSCULAR; INTRAVENOUS; SUBCUTANEOUS
Status: DISCONTINUED | OUTPATIENT
Start: 2022-04-29 | End: 2022-04-29 | Stop reason: HOSPADM

## 2022-04-29 RX ORDER — NALOXONE HYDROCHLORIDE 0.4 MG/ML
0.4 INJECTION, SOLUTION INTRAMUSCULAR; INTRAVENOUS; SUBCUTANEOUS
Status: DISCONTINUED | OUTPATIENT
Start: 2022-04-29 | End: 2022-04-29 | Stop reason: HOSPADM

## 2022-04-29 RX ORDER — PROPOFOL 10 MG/ML
INJECTION, EMULSION INTRAVENOUS PRN
Status: DISCONTINUED | OUTPATIENT
Start: 2022-04-29 | End: 2022-04-29

## 2022-04-29 RX ORDER — IBUPROFEN 200 MG
800 TABLET ORAL EVERY 8 HOURS PRN
COMMUNITY

## 2022-04-29 RX ORDER — SODIUM CHLORIDE, SODIUM LACTATE, POTASSIUM CHLORIDE, CALCIUM CHLORIDE 600; 310; 30; 20 MG/100ML; MG/100ML; MG/100ML; MG/100ML
INJECTION, SOLUTION INTRAVENOUS CONTINUOUS
Status: DISCONTINUED | OUTPATIENT
Start: 2022-04-29 | End: 2022-04-29 | Stop reason: HOSPADM

## 2022-04-29 RX ORDER — FLUMAZENIL 0.1 MG/ML
0.2 INJECTION, SOLUTION INTRAVENOUS
Status: DISCONTINUED | OUTPATIENT
Start: 2022-04-29 | End: 2022-04-29 | Stop reason: HOSPADM

## 2022-04-29 RX ORDER — ONDANSETRON 4 MG/1
4 TABLET, ORALLY DISINTEGRATING ORAL EVERY 6 HOURS PRN
Status: DISCONTINUED | OUTPATIENT
Start: 2022-04-29 | End: 2022-04-29 | Stop reason: HOSPADM

## 2022-04-29 RX ADMIN — LIDOCAINE HYDROCHLORIDE 100 MG: 20 INJECTION, SOLUTION INFILTRATION; PERINEURAL at 09:58

## 2022-04-29 RX ADMIN — PROPOFOL 100 MG: 10 INJECTION, EMULSION INTRAVENOUS at 09:58

## 2022-04-29 RX ADMIN — SODIUM CHLORIDE, POTASSIUM CHLORIDE, SODIUM LACTATE AND CALCIUM CHLORIDE: 600; 310; 30; 20 INJECTION, SOLUTION INTRAVENOUS at 09:54

## 2022-04-29 RX ADMIN — PROPOFOL 100 MG: 10 INJECTION, EMULSION INTRAVENOUS at 10:01

## 2022-04-29 RX ADMIN — PROPOFOL 200 MCG/KG/MIN: 10 INJECTION, EMULSION INTRAVENOUS at 09:58

## 2022-04-29 NOTE — ANESTHESIA POSTPROCEDURE EVALUATION
Patient: Cyrus Sanford    Procedure: Procedure(s):  ESOPHAGOGASTRODUODENOSCOPY, WITH BIOPSY       Anesthesia Type:  MAC    Note:  Disposition: Outpatient   Postop Pain Control: Uneventful            Sign Out: Well controlled pain   PONV: No   Neuro/Psych: Uneventful            Sign Out: Acceptable/Baseline neuro status   Airway/Respiratory: Uneventful            Sign Out: Acceptable/Baseline resp. status   CV/Hemodynamics: Uneventful            Sign Out: Acceptable CV status   Other NRE: NONE   DID A NON-ROUTINE EVENT OCCUR? No    Event details/Postop Comments:  Pt was happy with anesthesia care.  No complications.  I will follow up with the pt if needed.           Last vitals:  Vitals Value Taken Time   /87 04/29/22 1020   Temp     Pulse 61 04/29/22 1020   Resp 16 04/29/22 1015   SpO2 95 % 04/29/22 1015   Vitals shown include unvalidated device data.    Electronically Signed By: SUDHA Power CRNA  April 29, 2022  10:25 AM

## 2022-04-29 NOTE — H&P
Patient seen for Endoscopy    HPI:  Patient is a 51 year old male with GERD. Not taking blood thinning medications. No MI or CVA history. No issues with previous sedation. No recent acute illness.    Review Of Systems    Skin: negative  Ears/Nose/Throat: negative  Respiratory: No shortness of breath, dyspnea on exertion, cough, or hemoptysis  Cardiovascular: negative  Gastrointestinal: negative  Genitourinary: negative  Musculoskeletal: negative  Neurologic: negative  Hematologic/Lymphatic/Immunologic: negative  Endocrine: negative      Past Medical History:   Diagnosis Date     Hypertension      Primary osteoarthritis of right hip 4/23/2022     Uncomplicated asthma      Unspecified asthma(493.90)     Asthma       Past Surgical History:   Procedure Laterality Date     COLONOSCOPY N/A 12/10/2018    Procedure: COLONOSCOPY;  Surgeon: Zoran Montgomery MD;  [facility]     HC VASECTOMY UNILAT/BILAT W POSTOP SEMEN  2000    Vasectomy     PHACOEMULSIFICATION WITH STANDARD INTRAOCULAR LENS IMPLANT Right 1/31/2019    Procedure: PHACOEMULSIFICATION WITH STANDARD INTRAOCULAR LENS IMPLANT RIGHT;  Surgeon: Loyd Tavarez MD;  [facility]       Family History   Problem Relation Age of Onset     Respiratory Father         Asthma     Cancer Sister        Social History     Socioeconomic History     Marital status:      Spouse name: Not on file     Number of children: Not on file     Years of education: Not on file     Highest education level: Not on file   Occupational History     Not on file   Tobacco Use     Smoking status: Former Smoker     Smokeless tobacco: Current User     Types: Chew     Tobacco comment: 1 tin every 3 days   Substance and Sexual Activity     Alcohol use: Yes     Comment: 2 a day      Drug use: No     Sexual activity: Yes     Partners: Female   Other Topics Concern      Service No     Blood Transfusions No     Caffeine Concern No     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 Not Asked   Social History Narrative     Not on file     Social Determinants of Health     Financial Resource Strain: Not on file   Food Insecurity: Not on file   Transportation Needs: Not on file   Physical Activity: Not on file   Stress: Not on file   Social Connections: Not on file   Intimate Partner Violence: Not on file   Housing Stability: Not on file       No current outpatient medications on file.       Medications and history reviewed    Physical exam:  Vitals: BP (!) 141/90   Pulse 57   Temp 97.7  F (36.5  C) (Oral)   Resp 18   SpO2 100%   BMI= There is no height or weight on file to calculate BMI.    Constitutional: Healthy, alert, non-distressed   Head: Normo-cephalic, atraumatic, no lesions, masses or tenderness   Cardiovascular: RRR, no new murmurs, +S1, +S2 heart sounds, no clicks, rubs or gallops   Respiratory: CTAB, no rales, rhonchi or wheezing, equal chest rise, good respiratory effort   Gastrointestinal: Soft, non-tender, non distended, no rebound rigidity or guarding, no masses or hernias palpated   : Deferred  Musculoskeletal: Moves all extremities, normal  strength, no deformities noted   Skin: No suspicious lesions or rashes   Psychiatric: Mentation appears normal, affect appropriate   Hematologic/Lymphatic/Immunologic: Normal cervical and supraclavicular lymph nodes   Patient able to get up on table without difficulty.    Labs show:  No results found for this or any previous visit (from the past 24 hour(s)).    Assessment: Endoscopy  Plan: Pt cleared for anesthesia for proposed procedure.    Vinicius Baker DO

## 2022-04-29 NOTE — LETTER
Cyrus Sanford  [address] [place] [id number]    May 4, 2022      Dear Cyrus,  This letter is to inform you of the results of your pathology report from your endoscopy.  Your pathology report was:  Final Diagnosis   Stomach, biopsy:  -Histologic features benign fundic gland polyp  -Minimal chronic inflammation  -No evidence of Helicobacter on routine stain   This is a benign, non-concerning polyp. No additional treatment or evaluation is necessary.  If you have further questions please don t hesitate to call our clinic at [phone].   Sincerely,     Vinicius Baker, DO                   no

## 2022-04-29 NOTE — ANESTHESIA CARE TRANSFER NOTE
Patient: Cyrus Sanford    Procedure: Procedure(s):  ESOPHAGOGASTRODUODENOSCOPY, WITH BIOPSY       Diagnosis: Gastroesophageal reflux disease with esophagitis without hemorrhage [K21.00]  Diagnosis Additional Information: No value filed.    Anesthesia Type:   MAC     Note:    Oropharynx: spontaneously breathing  Level of Consciousness: drowsy  Oxygen Supplementation: room air    Independent Airway: airway patency satisfactory and stable  Dentition: dentition unchanged  Vital Signs Stable: post-procedure vital signs reviewed and stable  Report to RN Given: handoff report given  Patient transferred to: Phase II    Handoff Report: Identifed the Patient, Identified the Reponsible Provider, Reviewed the pertinent medical history, Discussed the surgical course, Reviewed Intra-OP anesthesia mangement and issues during anesthesia, Set expectations for post-procedure period and Allowed opportunity for questions and acknowledgement of understanding      Vitals:  Vitals Value Taken Time   /87 04/29/22 1020   Temp     Pulse 61 04/29/22 1020   Resp 16 04/29/22 1015   SpO2 95 % 04/29/22 1015   Vitals shown include unvalidated device data.    Electronically Signed By: SUDHA Power CRNA  April 29, 2022  10:24 AM

## 2022-04-29 NOTE — DISCHARGE INSTRUCTIONS
[facility]    Home Care Following Endoscopy          Activity:  You have just undergone an endoscopic procedure usually performed with conscious sedation.  Do not work or operate machinery (including a car) for at least 12 hours.    I encourage you to walk and attempt to pass this air as soon as possible.    Diet:  Return to the diet you were on before your procedure but eat lightly for the first 12-24 hours.  Drink plenty of water.  Resume any regular medications unless otherwise advised by your physician.  Please begin any new medication prescribed as a result of your procedure as directed by your physician.   If you had any biopsy or polyp removed please refrain from aspirin or aspirin products for 2 days.  If on Coumadin please restart as instructed by your physician.   Pain:  You may take Tylenol as needed for pain.  Expected Recovery:  You can expect some mild abdominal fullness and/or discomfort due to the air used to inflate your intestinal tract. It is also normal to have a mild sore throat after upper endoscopy.    Call Your Physician if You Have:  After Upper Endoscopy:  Shoulder, back or chest pain.  Difficulty breathing or swallowing.  Vomiting blood.    Any questions or concerns about your recovery, please call [phone] or after hours [phone] Nurse Advice Line.    Follow-up Care:  You did have polyps/biopsy tissue sample(s) removed.  The polyps/biopsy tissue sample(s) will be sent to pathology.  You should receive a letter in your My Chart with your results within 1-2 weeks. If you do not participate in My Chart a physical letter will come in the mail in 2-3 weeks.  Please call if you have not received a notification of your results.  If asked to return to clinic please make an appointment 1 week after your procedure.  Call [phone].

## 2022-05-03 LAB
PATH REPORT.COMMENTS IMP SPEC: NORMAL
PATH REPORT.COMMENTS IMP SPEC: NORMAL
PATH REPORT.FINAL DX SPEC: NORMAL
PATH REPORT.GROSS SPEC: NORMAL
PATH REPORT.MICROSCOPIC SPEC OTHER STN: NORMAL
PATH REPORT.RELEVANT HX SPEC: NORMAL
PHOTO IMAGE: NORMAL

## 2022-05-03 PROCEDURE — 88305 TISSUE EXAM BY PATHOLOGIST: CPT | Mod: 26 | Performed by: PATHOLOGY

## 2022-10-09 ENCOUNTER — HEALTH MAINTENANCE LETTER (OUTPATIENT)
Age: 52
End: 2022-10-09

## 2023-01-18 ENCOUNTER — APPOINTMENT (OUTPATIENT)
Dept: CT IMAGING | Facility: CLINIC | Age: 53
End: 2023-01-18
Attending: FAMILY MEDICINE
Payer: COMMERCIAL

## 2023-01-18 ENCOUNTER — HOSPITAL ENCOUNTER (EMERGENCY)
Facility: CLINIC | Age: 53
Discharge: HOME | End: 2023-01-19
Attending: FAMILY MEDICINE | Admitting: FAMILY MEDICINE
Payer: COMMERCIAL

## 2023-01-18 DIAGNOSIS — N23 RENAL COLIC: ICD-10-CM

## 2023-01-18 DIAGNOSIS — K57.32 DIVERTICULITIS OF COLON: ICD-10-CM

## 2023-01-18 DIAGNOSIS — N20.1 URETERAL STONE: ICD-10-CM

## 2023-01-18 LAB
ALBUMIN SERPL BCG-MCNC: 4.2 G/DL (ref 3.5–5.2)
ALBUMIN UR-MCNC: NEGATIVE MG/DL
ALP SERPL-CCNC: 88 U/L (ref 40–129)
ALT SERPL W P-5'-P-CCNC: 56 U/L (ref 10–50)
ANION GAP SERPL CALCULATED.3IONS-SCNC: 12 MMOL/L (ref 7–15)
APPEARANCE UR: CLEAR
AST SERPL W P-5'-P-CCNC: 32 U/L (ref 10–50)
BASOPHILS # BLD AUTO: 0.1 10E3/UL (ref 0–0.2)
BASOPHILS NFR BLD AUTO: 1 %
BILIRUB SERPL-MCNC: 0.7 MG/DL
BILIRUB UR QL STRIP: NEGATIVE
BUN SERPL-MCNC: 19.3 MG/DL (ref 6–20)
CALCIUM SERPL-MCNC: 9 MG/DL (ref 8.6–10)
CHLORIDE SERPL-SCNC: 103 MMOL/L (ref 98–107)
COLOR UR AUTO: YELLOW
CREAT SERPL-MCNC: 1.29 MG/DL (ref 0.67–1.17)
DEPRECATED HCO3 PLAS-SCNC: 24 MMOL/L (ref 22–29)
EOSINOPHIL # BLD AUTO: 0.3 10E3/UL (ref 0–0.7)
EOSINOPHIL NFR BLD AUTO: 4 %
ERYTHROCYTE [DISTWIDTH] IN BLOOD BY AUTOMATED COUNT: 14 % (ref 10–15)
GFR SERPL CREATININE-BSD FRML MDRD: 66 ML/MIN/1.73M2
GLUCOSE SERPL-MCNC: 122 MG/DL (ref 70–99)
GLUCOSE UR STRIP-MCNC: NEGATIVE MG/DL
HCT VFR BLD AUTO: 40.8 % (ref 40–53)
HGB BLD-MCNC: 13.2 G/DL (ref 13.3–17.7)
HGB UR QL STRIP: ABNORMAL
IMM GRANULOCYTES # BLD: 0.1 10E3/UL
IMM GRANULOCYTES NFR BLD: 1 %
KETONES UR STRIP-MCNC: NEGATIVE MG/DL
LEUKOCYTE ESTERASE UR QL STRIP: NEGATIVE
LYMPHOCYTES # BLD AUTO: 2.8 10E3/UL (ref 0.8–5.3)
LYMPHOCYTES NFR BLD AUTO: 30 %
MCH RBC QN AUTO: 27.4 PG (ref 26.5–33)
MCHC RBC AUTO-ENTMCNC: 32.4 G/DL (ref 31.5–36.5)
MCV RBC AUTO: 85 FL (ref 78–100)
MONOCYTES # BLD AUTO: 1.1 10E3/UL (ref 0–1.3)
MONOCYTES NFR BLD AUTO: 12 %
MUCOUS THREADS #/AREA URNS LPF: PRESENT /LPF
NEUTROPHILS # BLD AUTO: 5.1 10E3/UL (ref 1.6–8.3)
NEUTROPHILS NFR BLD AUTO: 52 %
NITRATE UR QL: NEGATIVE
NRBC # BLD AUTO: 0 10E3/UL
NRBC BLD AUTO-RTO: 0 /100
PH UR STRIP: 6.5 [PH] (ref 5–7)
PLATELET # BLD AUTO: 249 10E3/UL (ref 150–450)
POTASSIUM SERPL-SCNC: 3.8 MMOL/L (ref 3.4–5.3)
PROT SERPL-MCNC: 6.9 G/DL (ref 6.4–8.3)
RBC # BLD AUTO: 4.82 10E6/UL (ref 4.4–5.9)
RBC URINE: 1 /HPF
SODIUM SERPL-SCNC: 139 MMOL/L (ref 136–145)
SP GR UR STRIP: 1.02 (ref 1–1.03)
UROBILINOGEN UR STRIP-MCNC: NORMAL MG/DL
WBC # BLD AUTO: 9.5 10E3/UL (ref 4–11)
WBC URINE: 3 /HPF

## 2023-01-18 PROCEDURE — 96375 TX/PRO/DX INJ NEW DRUG ADDON: CPT | Performed by: FAMILY MEDICINE

## 2023-01-18 PROCEDURE — 99285 EMERGENCY DEPT VISIT HI MDM: CPT | Mod: 25 | Performed by: FAMILY MEDICINE

## 2023-01-18 PROCEDURE — 250N000011 HC RX IP 250 OP 636: Performed by: FAMILY MEDICINE

## 2023-01-18 PROCEDURE — 99284 EMERGENCY DEPT VISIT MOD MDM: CPT | Performed by: FAMILY MEDICINE

## 2023-01-18 PROCEDURE — 74176 CT ABD & PELVIS W/O CONTRAST: CPT

## 2023-01-18 PROCEDURE — 80053 COMPREHEN METABOLIC PANEL: CPT | Performed by: FAMILY MEDICINE

## 2023-01-18 PROCEDURE — 96374 THER/PROPH/DIAG INJ IV PUSH: CPT | Performed by: FAMILY MEDICINE

## 2023-01-18 PROCEDURE — 96361 HYDRATE IV INFUSION ADD-ON: CPT | Performed by: FAMILY MEDICINE

## 2023-01-18 PROCEDURE — 85025 COMPLETE CBC W/AUTO DIFF WBC: CPT | Performed by: FAMILY MEDICINE

## 2023-01-18 PROCEDURE — 36415 COLL VENOUS BLD VENIPUNCTURE: CPT | Performed by: FAMILY MEDICINE

## 2023-01-18 PROCEDURE — 258N000003 HC RX IP 258 OP 636: Performed by: FAMILY MEDICINE

## 2023-01-18 PROCEDURE — 81003 URINALYSIS AUTO W/O SCOPE: CPT | Performed by: FAMILY MEDICINE

## 2023-01-18 RX ORDER — KETOROLAC TROMETHAMINE 30 MG/ML
30 INJECTION, SOLUTION INTRAMUSCULAR; INTRAVENOUS ONCE
Status: COMPLETED | OUTPATIENT
Start: 2023-01-18 | End: 2023-01-18

## 2023-01-18 RX ORDER — ONDANSETRON 2 MG/ML
4 INJECTION INTRAMUSCULAR; INTRAVENOUS EVERY 30 MIN PRN
Status: DISCONTINUED | OUTPATIENT
Start: 2023-01-18 | End: 2023-01-19 | Stop reason: HOSPADM

## 2023-01-18 RX ORDER — HYDROMORPHONE HYDROCHLORIDE 1 MG/ML
0.5 INJECTION, SOLUTION INTRAMUSCULAR; INTRAVENOUS; SUBCUTANEOUS
Status: DISCONTINUED | OUTPATIENT
Start: 2023-01-18 | End: 2023-01-19 | Stop reason: HOSPADM

## 2023-01-18 RX ADMIN — HYDROMORPHONE HYDROCHLORIDE 0.5 MG: 1 INJECTION, SOLUTION INTRAMUSCULAR; INTRAVENOUS; SUBCUTANEOUS at 22:23

## 2023-01-18 RX ADMIN — KETOROLAC TROMETHAMINE 30 MG: 30 INJECTION, SOLUTION INTRAMUSCULAR at 22:21

## 2023-01-18 RX ADMIN — ONDANSETRON HYDROCHLORIDE 4 MG: 2 SOLUTION INTRAMUSCULAR; INTRAVENOUS at 22:18

## 2023-01-18 RX ADMIN — SODIUM CHLORIDE 1000 ML: 9 INJECTION, SOLUTION INTRAVENOUS at 22:18

## 2023-01-18 ASSESSMENT — ACTIVITIES OF DAILY LIVING (ADL): ADLS_ACUITY_SCORE: 35

## 2023-01-19 VITALS
HEART RATE: 63 BPM | OXYGEN SATURATION: 95 % | DIASTOLIC BLOOD PRESSURE: 83 MMHG | WEIGHT: 214.2 LBS | RESPIRATION RATE: 16 BRPM | SYSTOLIC BLOOD PRESSURE: 134 MMHG | BODY MASS INDEX: 26.07 KG/M2 | TEMPERATURE: 98.1 F

## 2023-01-19 RX ORDER — CIPROFLOXACIN 500 MG/1
500 TABLET, FILM COATED ORAL 2 TIMES DAILY
Qty: 14 TABLET | Refills: 0 | Status: SHIPPED | OUTPATIENT
Start: 2023-01-19 | End: 2023-01-26

## 2023-01-19 RX ORDER — METRONIDAZOLE 500 MG/1
500 TABLET ORAL 2 TIMES DAILY
Qty: 14 TABLET | Refills: 0 | Status: SHIPPED | OUTPATIENT
Start: 2023-01-19 | End: 2023-01-26

## 2023-01-19 RX ORDER — TAMSULOSIN HYDROCHLORIDE 0.4 MG/1
0.4 CAPSULE ORAL DAILY
Qty: 7 CAPSULE | Refills: 0 | Status: SHIPPED | OUTPATIENT
Start: 2023-01-19 | End: 2023-04-05

## 2023-01-19 RX ORDER — OXYCODONE HYDROCHLORIDE 5 MG/1
5 TABLET ORAL EVERY 6 HOURS PRN
Qty: 12 TABLET | Refills: 0 | Status: SHIPPED | OUTPATIENT
Start: 2023-01-19 | End: 2023-04-05

## 2023-01-19 NOTE — ED PROVIDER NOTES
[facility] ED Provider Note   Patient: Cyrus Sanford  MRN #:  [id number]  Date of Visit: January 18, 2023    CC:     Chief Complaint   Patient presents with     Flank Pain     Vomiting     HPI:  Cyrus Sanford is a 52 year old male who presented to the emergency department with cute onset of right flank pain that started between 7 and 8 PM tonight.  Patient was sitting on the couch when the pain hit him.  He had never had anything like this before.  He rates his pain level between 7-9 out of 10.  Patient has had nausea and vomiting x3.  There has been no fever, chills, gross hematuria.  He has not had any urinary symptoms.  Past history of hypertension, hyperlipidemia, and osteoarthritis of the right hip.  His wife states that he takes a lot of ibuprofen.  He is on Norvasc and atorvastatin.  He has no known drug allergies although he has GI side effects Zithromax.  He quit tobacco use a year ago.  There is a family history of kidney stones in the father.    Problem List:  Patient Active Problem List    Diagnosis Date Noted     Primary osteoarthritis of right hip 04/23/2022     Priority: Medium     Hyperlipidemia LDL goal <130 01/11/2021     Priority: Medium     Cataract of right eye, unspecified cataract type 01/03/2019     Priority: Medium     Hypertension goal BP (blood pressure) < 140/90 02/05/2015     Priority: Medium     CARDIOVASCULAR SCREENING; LDL GOAL LESS THAN 130 08/29/2013     Priority: Medium     Tenosynovitis of finger 01/02/2012     Priority: Medium       Past Medical History:   Diagnosis Date     Hypertension      Primary osteoarthritis of right hip 4/23/2022     Uncomplicated asthma      Unspecified asthma(493.90)        MEDS: ciprofloxacin (CIPRO) 500 MG tablet  metroNIDAZOLE (FLAGYL) 500 MG tablet  oxyCODONE (ROXICODONE) 5 MG tablet  tamsulosin (FLOMAX) 0.4 MG capsule  amLODIPine (NORVASC) 5 MG tablet  atorvastatin (LIPITOR) 40  MG tablet  ibuprofen (ADVIL/MOTRIN) 200 MG tablet  Multiple Vitamin (MULTIVITAMIN ADULT PO)  Vitamin D, Cholecalciferol, 25 MCG (1000 UT) TABS        ALLERGIES:    Allergies   Allergen Reactions     No Known Allergies        Past Surgical History:   Procedure Laterality Date     COLONOSCOPY N/A 12/10/2018    Procedure: COLONOSCOPY;  Surgeon: Zoran Montgomery MD;  Location:  GI     ESOPHAGOSCOPY, GASTROSCOPY, DUODENOSCOPY (EGD), COMBINED N/A 4/29/2022    Procedure: ESOPHAGOGASTRODUODENOSCOPY, WITH BIOPSY;  Surgeon: Vinicius Baker DO;  Location: [facility]     HC VASECTOMY UNILAT/BILAT W POSTOP SEMEN  2000    Vasectomy     PHACOEMULSIFICATION WITH STANDARD INTRAOCULAR LENS IMPLANT Right 1/31/2019    Procedure: PHACOEMULSIFICATION WITH STANDARD INTRAOCULAR LENS IMPLANT RIGHT;  Surgeon: Loyd Tavarez MD;  Location:  OR       Social History     Tobacco Use     Smoking status: Former     Smokeless tobacco: Current     Types: Chew     Tobacco comments:     1 tin every 3 days   Substance Use Topics     Alcohol use: Yes     Comment: 2 a day      Drug use: No         Review of Systems   Except as noted in HPI, all other systems were reviewed and are negative    Physical Exam     Vitals were reviewed  Patient Vitals for the past 12 hrs:   BP Temp Temp src Pulse Resp SpO2 Weight   01/19/23 0000 134/83 -- -- 63 -- 95 % --   01/18/23 2350 -- -- -- -- -- 94 % --   01/18/23 2340 -- -- -- -- -- 95 % --   01/18/23 2330 (!) 140/81 -- -- 62 -- 98 % --   01/18/23 2320 -- -- -- -- -- 98 % --   01/18/23 2310 -- -- -- -- -- 96 % --   01/18/23 2300 137/85 -- -- 62 -- 96 % --   01/18/23 2250 -- -- -- -- -- 93 % --   01/18/23 2230 132/84 -- -- 57 -- 99 % --   01/18/23 2154 (!) 151/81 98.1  F (36.7  C) Oral 54 16 100 % 97.2 kg (214 lb 3.2 oz)     GENERAL APPEARANCE: Alert and oriented x3, moderate to severe distress due to pain  FACE: normal facies  EYES: Pupils are equal; sclerae is nonicteric  HENT: normal external  exam  NECK: no adenopathy or asymmetry  RESP: normal respiratory effort; clear breath sounds bilaterally  CV: regular rate and rhythm; no significant murmurs, gallops or rubs  ABD: soft, right flank tenderness; no rebound or guarding; bowel sounds are normal  EXT: No calf tenderness or pitting edema  SKIN: no worrisome rash  NEURO: no facial droop; no focal deficits, speech is normal  PSYCH: normal mood and affect      Available Lab/Imaging Results     Results for orders placed or performed during the hospital encounter of 01/18/23 (from the past 24 hour(s))   CBC with platelets differential    Narrative    The following orders were created for panel order CBC with platelets differential.  Procedure                               Abnormality         Status                     ---------                               -----------         ------                     CBC with platelets and d...[[id number]]  Abnormal            Final result                 Please view results for these tests on the individual orders.   Comprehensive metabolic panel   Result Value Ref Range    Sodium 139 136 - 145 mmol/L    Potassium 3.8 3.4 - 5.3 mmol/L    Chloride 103 98 - 107 mmol/L    Carbon Dioxide (CO2) 24 22 - 29 mmol/L    Anion Gap 12 7 - 15 mmol/L    Urea Nitrogen 19.3 6.0 - 20.0 mg/dL    Creatinine 1.29 (H) 0.67 - 1.17 mg/dL    Calcium 9.0 8.6 - 10.0 mg/dL    Glucose 122 (H) 70 - 99 mg/dL    Alkaline Phosphatase 88 40 - 129 U/L    AST 32 10 - 50 U/L    ALT 56 (H) 10 - 50 U/L    Protein Total 6.9 6.4 - 8.3 g/dL    Albumin 4.2 3.5 - 5.2 g/dL    Bilirubin Total 0.7 <=1.2 mg/dL    GFR Estimate 66 >60 mL/min/1.73m2    Narrative    ISTAT RESULTS   CBC with platelets and differential   Result Value Ref Range    WBC Count 9.5 4.0 - 11.0 10e3/uL    RBC Count 4.82 4.40 - 5.90 10e6/uL    Hemoglobin 13.2 (L) 13.3 - 17.7 g/dL    Hematocrit 40.8 40.0 - 53.0 %    MCV 85 78 - 100 fL    MCH 27.4 26.5 - 33.0 pg    MCHC 32.4 31.5 - 36.5 g/dL    RDW  14.0 10.0 - 15.0 %    Platelet Count 249 150 - 450 10e3/uL    % Neutrophils 52 %    % Lymphocytes 30 %    % Monocytes 12 %    % Eosinophils 4 %    % Basophils 1 %    % Immature Granulocytes 1 %    NRBCs per 100 WBC 0 <1 /100    Absolute Neutrophils 5.1 1.6 - 8.3 10e3/uL    Absolute Lymphocytes 2.8 0.8 - 5.3 10e3/uL    Absolute Monocytes 1.1 0.0 - 1.3 10e3/uL    Absolute Eosinophils 0.3 0.0 - 0.7 10e3/uL    Absolute Basophils 0.1 0.0 - 0.2 10e3/uL    Absolute Immature Granulocytes 0.1 <=0.4 10e3/uL    Absolute NRBCs 0.0 10e3/uL   UA with Microscopic reflex to Culture    Specimen: Urine, Midstream   Result Value Ref Range    Color Urine Yellow Colorless, Straw, Light Yellow, Yellow    Appearance Urine Clear Clear    Glucose Urine Negative Negative mg/dL    Bilirubin Urine Negative Negative    Ketones Urine Negative Negative mg/dL    Specific Gravity Urine 1.025 1.003 - 1.035    Blood Urine Trace (A) Negative    pH Urine 6.5 5.0 - 7.0    Protein Albumin Urine Negative Negative mg/dL    Urobilinogen Urine Normal Normal, 2.0 mg/dL    Nitrite Urine Negative Negative    Leukocyte Esterase Urine Negative Negative    Mucus Urine Present (A) None Seen /LPF    RBC Urine 1 <=2 /HPF    WBC Urine 3 <=5 /HPF    Narrative    Urine Culture not indicated   Abd/pelvis CT - no contrast - Stone Protocol    Narrative    EXAM: CT ABDOMEN PELVIS W/O CONTRAST  LOCATION: [facility]  DATE/TIME: 1/18/2023 11:34 PM    INDICATION: Right flank pain, N/V x 3.  COMPARISON: None.  TECHNIQUE: CT scan of the abdomen and pelvis was performed without IV contrast. Multiplanar reformats were obtained. Dose reduction techniques were used.  CONTRAST: None.    FINDINGS:   LOWER CHEST: Mild dependent atelectasis at the lung bases.    HEPATOBILIARY: Normal.    PANCREAS: Normal.    SPLEEN: Normal.    ADRENAL GLANDS: Normal.    KIDNEYS/BLADDER: There is mild dilatation of the right renal collecting system and ureter into the  pelvis where there is a 2 mm ureterovesical junction stone. There are approximately 8 stones within the right kidney measuring up to 4 mm. There are   approximately 6 stones within the left kidney measuring up to 3 mm.    BOWEL: There are colonic diverticula. There is mild inflammation adjacent to the sigmoid colon in the right posterior pelvis consistent with acute diverticulitis. No perforation or abscess formation. No bowel obstruction. No free intraperitoneal gas or   fluid.    LYMPH NODES: Normal.    VASCULATURE: Unremarkable.    PELVIC ORGANS: Normal.    MUSCULOSKELETAL: Normal.      Impression    IMPRESSION:   1.  Mild right hydronephrosis caused by a 2 mm ureterovesical junction stone.  2.  Several bilateral intrarenal stones.  3.  Mild sigmoid diverticulitis. No perforation or abscess.              Impression     Final diagnoses:   Renal colic (Right)   Ureteral stone (2 mm right uvj)   Diverticulitis of colon         ED Course & Medical Decision Making   Cyrus Sanford is a 52 year old male who presented to the emergency department with cute onset of right flank pain that started between 7-8 PM.  Patient has never had this type of pain before.  Patient was seen shortly after arrival.  Pain level is rated 7-9 out of 10.  Patient appeared to have renal colic.  Abdomen was soft and nontender.  Vital signs reveal a temp of 98.1, blood pressure of 151/81, with subsequent blood pressure 137/85.  Urinalysis revealed 1 red blood cell, and 3 white blood cells.  CBC was normal, but comprehensive metabolic panel revealed a creatinine of 1.29, glucose of 122.  CT scan of the abdomen revealed a mild right hydronephrosis caused by a 2 mm UVJ stone.  Several bilateral intrarenal stones.  Mild sigmoid diverticulitis.  No perforation or abscess.  Patient received IV fluids and Toradol with significant improvement in his pain.    Medications   0.9% sodium chloride BOLUS (0 mLs Intravenous Stopped 1/19/23 0000)   ketorolac  (TORADOL) injection 30 mg (30 mg Intravenous Given 1/18/23 2221)        Patient was informed of his CT findings.  He has numerous bilateral stones, approximately 8 stones within the right kidney measuring up to 4 mm, and approximately 6 stones in the left kidney measuring up to 3 mm.  Incidentally, patient has mild inflammation adjacent to the sigmoid colon in the right posterior pelvis consistent with acute diverticulitis.  There is no perforation or abscess formation.  Patient was reexamined and did not have any abdominal tenderness in either quadrants.  We discussed that this may be mild asymptomatic diverticulitis in which case he can treat with a low residue diet.  If he starts to develop any increased symptoms, I would like him to begin Cipro and Flagyl twice a day for 7 days.  Patient is stable for discharge home.  Continue ibuprofen and Tylenol for mild to moderate pain and reserve oxycodone for severe pain.  Take Flomax 0.4 mg daily to help expedite passage of the stone.  See detailed discharge instructions below.        Written after-visit summary and instructions were given at the time of discharge.    Follow up Plan:   [facility] Emergency Dept  [address] [place] [address] [phone]    If symptoms worsen      Discharge Instructions:   You have a 2 mm stone in the right ureterovesical junction, close to passing into the bladder.  You have additional stones in both kidneys.  Please see the attached handout.  You should be on a low oxalate diet.  Push lots of water and do not allow your urine to get concentrated.  Take ibuprofen/Tylenol as needed for mild to moderate pain, and reserve oxycodone for severe pain.  Take Flomax 0.4 mg daily to help expedite passage of the stone.  Your CT scan shows the possibility that you have diverticulitis.  Please see the attached handout.  Since you do not have any symptoms at this time, I would recommend a low residue diet.  Take  Cipro/Flagyl twice a day for 7 days if you start to have symptoms consistent with diverticulitis.  Return to the emergency department if you develop fever or any other new or worsening symptoms.       Disclaimer: This note consists of words and symbols derived from keyboarding and dictation using voice recognition software.  As a result, there may be errors that have gone undetected.  Please consider this when interpreting information found in this note.       Catrachita Alba MD  01/19/23 [id number]     (4) rarely moist

## 2023-01-19 NOTE — DISCHARGE INSTRUCTIONS
You have a 2 mm stone in the right ureterovesical junction, close to passing into the bladder.  You have additional stones in both kidneys.  Please see the attached handout.  You should be on a low oxalate diet.  Push lots of water and do not allow your urine to get concentrated.  Take ibuprofen/Tylenol as needed for mild to moderate pain, and reserve oxycodone for severe pain.  Take Flomax 0.4 mg daily to help expedite passage of the stone.  Your CT scan shows the possibility that you have diverticulitis.  Please see the attached handout.  Since you do not have any symptoms at this time, I would recommend a low residue diet.  Take Cipro/Flagyl twice a day for 7 days if you start to have symptoms consistent with diverticulitis.  Return to the emergency department if you develop fever or any other new or worsening symptoms.

## 2023-01-19 NOTE — ED TRIAGE NOTES
Pt presents with severe right flank pain . Pt states started at 8 pm tonite. Vomiting times 3.      Triage Assessment     Row Name 01/18/23 9776       Triage Assessment (Adult)    Airway WDL WDL       Respiratory WDL    Respiratory WDL WDL       Skin Circulation/Temperature WDL    Skin Circulation/Temperature WDL X  pale       Cardiac WDL    Cardiac WDL WDL       Peripheral/Neurovascular WDL    Peripheral Neurovascular WDL WDL       Cognitive/Neuro/Behavioral WDL    Cognitive/Neuro/Behavioral WDL WDL

## 2023-02-01 ENCOUNTER — OFFICE VISIT (OUTPATIENT)
Dept: FAMILY MEDICINE | Facility: CLINIC | Age: 53
End: 2023-02-01
Payer: OTHER MISCELLANEOUS

## 2023-02-01 ENCOUNTER — HOSPITAL ENCOUNTER (OUTPATIENT)
Dept: GENERAL RADIOLOGY | Facility: CLINIC | Age: 53
Discharge: HOME | End: 2023-02-01
Attending: STUDENT IN AN ORGANIZED HEALTH CARE EDUCATION/TRAINING PROGRAM | Admitting: STUDENT IN AN ORGANIZED HEALTH CARE EDUCATION/TRAINING PROGRAM
Payer: OTHER MISCELLANEOUS

## 2023-02-01 VITALS
RESPIRATION RATE: 16 BRPM | HEART RATE: 62 BPM | WEIGHT: 216 LBS | TEMPERATURE: 98 F | BODY MASS INDEX: 26.29 KG/M2 | DIASTOLIC BLOOD PRESSURE: 84 MMHG | SYSTOLIC BLOOD PRESSURE: 148 MMHG | OXYGEN SATURATION: 99 %

## 2023-02-01 DIAGNOSIS — S49.92XA SHOULDER INJURY, LEFT, INITIAL ENCOUNTER: ICD-10-CM

## 2023-02-01 DIAGNOSIS — S49.92XA SHOULDER INJURY, LEFT, INITIAL ENCOUNTER: Primary | ICD-10-CM

## 2023-02-01 PROCEDURE — 99213 OFFICE O/P EST LOW 20 MIN: CPT | Performed by: STUDENT IN AN ORGANIZED HEALTH CARE EDUCATION/TRAINING PROGRAM

## 2023-02-01 PROCEDURE — 73030 X-RAY EXAM OF SHOULDER: CPT | Mod: LT

## 2023-02-01 ASSESSMENT — PAIN SCALES - GENERAL: PAINLEVEL: MILD PAIN (3)

## 2023-02-01 NOTE — PROGRESS NOTES
"  Assessment & Plan     Shoulder injury, left, initial encounter  Patient with left shoulder injury with pain that has persisted.  Strength appears intact as well as range of motion.  Low concern for complete tear.  Likely rotator cuff strain versus tendinopathy.  X-ray today with normal joint space.  We will have patient follow-up with physical therapy as well as rest ice and anti-inflammatories.  If things not improving I would obtain MRI.  - XR Shoulder Left 2 Views      BMI:   Estimated body mass index is 26.29 kg/m  as calculated from the following:    Height as of 4/20/22: 1.93 m (6' 4\").    Weight as of this encounter: 98 kg (216 lb).         Jack Starkey MD  [facility] SALINA Bridges is a 52 year old, presenting for the following health issues:  Shoulder Injury      History of Present Illness       Reason for visit:  Shoulder injury  Symptom onset:  More than a month  Symptoms include:  Shoulder pain  Symptom intensity:  Moderate  Symptom progression:  Staying the same  Had these symptoms before:  No  What makes it worse:  Lifting above my head  What makes it better:  No    He eats 0-1 servings of fruits and vegetables daily.He consumes 2 sweetened beverage(s) daily.He exercises with enough effort to increase his heart rate 10 to 19 minutes per day.  He exercises with enough effort to increase his heart rate 3 or less days per week.   He is taking medications regularly.     Patient fell at work and hit shoulder on a cafeteria table chair.  Has had discomfort since and thought things would get better with time.  Has trouble with overhead lifting.  She does have discomfort when he is lying down at night.  No major surgeries or injuries in the past that he notes.    Review of Systems   Constitutional, HEENT, cardiovascular, pulmonary, gi and gu systems are negative, except as otherwise noted.      Objective    BP (!) 148/84   Pulse 62   Temp 98  F (36.7  C) (Temporal)   " Resp 16   Wt 98 kg (216 lb)   SpO2 99%   BMI 26.29 kg/m    Body mass index is 26.29 kg/m .  Physical Exam   GENERAL: healthy, alert and no distress  EYES: Eyes grossly normal to inspection, PERRL and conjunctivae and sclerae normal  NECK: no adenopathy, no asymmetry, masses, or scars and thyroid normal to palpation  RESP: lungs clear to auscultation - no rales, rhonchi or wheezes  CV: regular rate and rhythm, normal S1 S2, no murmur, click or rub, no peripheral edema and peripheral pulses strong  MS: no gross musculoskeletal defects noted, no edema, lateral shoulder tenderness without anterior tenderness.  Left shoulder positive empty can and Gunter, range of motion intact.  SKIN: no suspicious lesions or rashes  NEURO: Normal strength and tone, mentation intact and speech normal  PSYCH: mentation appears normal, affect normal/bright

## 2023-02-21 ENCOUNTER — HOSPITAL ENCOUNTER (OUTPATIENT)
Dept: PHYSICAL THERAPY | Facility: CLINIC | Age: 53
Setting detail: THERAPIES SERIES
Discharge: HOME | End: 2023-02-21
Attending: STUDENT IN AN ORGANIZED HEALTH CARE EDUCATION/TRAINING PROGRAM
Payer: OTHER MISCELLANEOUS

## 2023-02-21 DIAGNOSIS — S49.92XA SHOULDER INJURY, LEFT, INITIAL ENCOUNTER: ICD-10-CM

## 2023-02-21 PROCEDURE — 97110 THERAPEUTIC EXERCISES: CPT | Mod: GP

## 2023-02-21 PROCEDURE — 97161 PT EVAL LOW COMPLEX 20 MIN: CPT | Mod: GP

## 2023-02-22 NOTE — PROGRESS NOTES
02/21/23 1615   General Information   Type of Visit Initial OP Ortho PT Evaluation   Start of Care Date 02/21/23   Referring Physician Jack Starkey MD   Orders Evaluate and Treat   Orders Comment Per Associated Diagnosis   Date of Order 02/07/23   Certification Required? No  (Email central insurance for authorization.)   Medical Diagnosis Shoulder injury, left, initial encounter (S49.92XA)   Surgical/Medical history reviewed Yes   Precautions/Limitations no known precautions/limitations   Weight-Bearing Status - LUE full weight-bearing   Weight-Bearing Status - RUE full weight-bearing   Weight-Bearing Status - LLE full weight-bearing   Weight-Bearing Status - RLE full weight-bearing       Present No   Body Part(s)   Body Part(s) Shoulder   Presentation and Etiology   Pertinent history of current problem (include personal factors and/or comorbidities that impact the POC) Patient reports pain began with fall on October 20th of 2022, patient reports pain has been lingering since then and has been toughing out his pain since this date. Patient past medical history includes hypertension, primary OA of his right hip, and asthma.   Impairments A. Pain;D. Decreased ROM;E. Decreased flexibility;F. Decreased strength and endurance   Functional Limitations perform activities of daily living;perform required work activities   Symptom Location Patient reports deltoid insertion site pain on his left shoulder.   How/Where did it occur With a fall   Onset date of current episode/exacerbation 10/20/22   Chronicity New   Pain rating (0-10 point scale) Best (/10);Worst (/10)   Best (/10) 1/10   Worst (/10) 5/10   Pain quality A. Sharp;B. Dull;C. Aching  (laying is dull ache, functional ADL's are sharp)   Frequency of pain/symptoms C. With activity   Pain/symptoms are: The same all the time   Pain/symptoms exacerbated by H. Overhead reach;L. Work tasks;J. ADL;G. Certain positions;D. Carrying;C. Lifting    Pain/symptoms eased by C. Rest;F. Certain positions;I. OTC medication(s)  (no hot or cold pack usage; reports heavy ibuprofen usage for his hip but otherwise.)   Progression of symptoms since onset: Unchanged   Current / Previous Interventions   Diagnostic Tests: X-ray   X-ray Results unremarkable   Prior Level of Function   Prior Level of Function-Mobility Independent   Prior Level of Function-ADLs Independent   Functional Level Prior Comment Patient reports independence with mobility and ADL's during today's session.   Current Level of Function   Current Community Support Family/friend caregiver   Patient role/employment history A. 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   Current equipment-Gait/Locomotion None   Current equipment-ADL None   Fall Risk Screen   Fall screen completed by PT   Have you fallen 2 or more times in the past year? No   Have you fallen and had an injury in the past year? Yes   Is patient a fall risk? No   Fall screen comments Patient's only fall was last October, unrelated to current shoulder pain.   Abuse Screen (yes response referral indicated)   Feels Unsafe at Home or Work/School no   Feels Threatened by Someone no   Does Anyone Try to Keep You From Having Contact with Others or Doing Things Outside Your Home? no   Physical Signs of Abuse Present no   Patient needs abuse support services and resources No   Functional Scales   Functional Scales Other   Other Scales  SPADI   Shoulder Objective Findings   Side (if bilateral, select both right and left) Left   Cervical Screen (ROM, quadrant) WNL's for all motions without LUE shoulder pain exacerbation.   Shoulder ROM Comment Patient has gross left shoulder ROM WNL's but with pain into shoulder ABD and end range shoulder IR. Patient RUE grossly WNL's with shoulder, elbow, and wrist AROM.  Patient's PROM for left overhead shoulder movements improved before pain is felt in left shoulder.   Neer's Test Mild left shoulder tightness at end range left shoulder flexion & IR.   Gunter-Lopez Test Negative with seated and sleeper stretch positions.   Sulcus Test Negative with left shoulder.   Shoulder Special Tests Comments Pain more reproducible with contractile overhead loading, doesn't appear to be impingement related pain. Patient negative LUE speed's and bicep's tension test.   Palpation Patient tender to palpation along his left lateral glenohumeral joint near his deltoid insertion.   Observation No obervable bony or biomechanical cervical or bilateral deformities noticed during today's session. BUE shoulder bulk appears symmetrical bilaterally.   Integumentary  No significant findings.   Posture No significant findings.   Left Shoulder Flexion AROM WNL's, RUE shoulder pain at 90 degrees   Left Shoulder Abduction AROM WNL's, RUE shoulder pain at 90 degrees   Left Shoulder ER AROM WNL's, able to reach behind head without pain.   Left Shoulder IR AROM 3 vertebral levels below scapulae   Left Shoulder IR PROM PROM up to inferior scapular level with DPT overpressure.   Left Shoulder Flexion Strength 4-/5 for LLE, 4/5 for RLE   Left Shoulder Abduction Strength 4-/5 for LLE, 4/5 for RLE   Left Shoulder ER Strength 4-/5 for LLE, 4/5 for RLE   Left Shoulder IR Strength 4-/5 for LLE, 4/5 for RLE  All right upper extremity shoulder, elbow, and wrist strengthening grossly 4/5 with seated and standing MMT testing during today's session.    Planned Therapy Interventions   Planned Therapy Interventions ADL retraining;manual therapy;joint mobilization;motor coordination training;neuromuscular re-education;ROM;strengthening;stretching   Planned Therapy Interventions Comment Interventions for improved lefting mechanics with left shoulder, as well as progressive pain free left shoulder range of motion and  strengthening.   Planned Modality Interventions   Planned Modality Interventions Cryotherapy;Electrical stimulation;Hot packs;TENS;Ultrasound   Planned Modality Interventions Comments Pain modulation of left shoulder.   Clinical Impression   Criteria for Skilled Therapeutic Interventions Met yes, treatment indicated   PT Diagnosis Left Shoulder Pain   Influenced by the following impairments Shoulder injury, left, initial encounter (S49.92XA)   Functional limitations due to impairments Patient unable to perform repetitive overhead work related movement and lifting demands due to his current left shoulder pain.   Clinical Presentation Stable/Uncomplicated   Clinical Presentation Rationale Patient independent with mobility and ADL's prior to and after his current onset of left shoulder pain. Patient has gross BUE PROM WNL's with left shoulder AROM limited in abduction and IR due to pain. Patient is neurologically intact in his BUE's with light touch sensation testing, and has mild LUE pain related shoulder weakness.   Clinical Decision Making (Complexity) Low complexity   Therapy Frequency 1 time/week   Predicted Duration of Therapy Intervention (days/wks) 6 weeks   Risk & Benefits of therapy have been explained Yes   Patient, Family & other staff in agreement with plan of care Yes   Clinical Impression Comments Patient in good spirits during today's session. Patient reports his pain began in later October of 2022 after a fall he experienced, landing onto his left shoulder where he reports he felt in jolt up into his shoulder socket. Patient reports his primary left shoulder pain aggravators are overhead reaching and lifting work related demands and ADL's. Patient reports his primary pain relievers are relative rest, ibuprofen which he uses for his hip, and non-weightbearing movement positions. Patient reports wishing to participate in physical therapy services for reduced pain with work related left shoulder overhead  and lifting movement demands. Patient will benefit from skilled physical therapy services and has sufficient social support.   Education Assessment   Preferred Learning Style Demonstration   Barriers to Learning No barriers   ORTHO GOALS   PT Ortho Eval Goals 1;2;3   Ortho Goal 1   Goal Identifier SPADI   Goal Description Patient will reduce her initial SPADI assessment score by 16% or greater to demonstrates reduced restriction of his left shoulder pain on performance of his upper extremity ADL's and work demands.   Goal Progress See initial SPADI assessment score.   Target Date 04/04/23   Ortho Goal 2   Goal Identifier Home Exercise Program   Goal Description Patient will demonstrate proper performance of and good adherence to his home exercise programs for 6 weeks to demonstrate improved independence with long term management of his left shoulder pain and functional deficit symptoms.   Goal Progress Patient tolerated initial left shoulder ROM and strengthening exercises during today' s session with approrpiate modification.   Target Date 04/04/23   Ortho Goal 3   Goal Identifier Lifting   Goal Description Patient will lift a 5 pound object above shoulder height with 2/10 or less left shoulder pain to demonstrate improved tolerance for repetitive upper extremity work lifting and overhead demands.   Goal Progress Not assessed today.   Target Date 04/04/23   Total Evaluation Time   PT Eval, Low Complexity Minutes (74671) 25

## 2023-02-23 NOTE — ED NOTES
Reviewed discharge instruction, verbalized understanding. No questions or concerns. IV removed. Meds reviewed. VS reassessed.      [phone]

## 2023-02-28 ENCOUNTER — HOSPITAL ENCOUNTER (OUTPATIENT)
Dept: PHYSICAL THERAPY | Facility: CLINIC | Age: 53
Setting detail: THERAPIES SERIES
Discharge: HOME | End: 2023-02-28
Attending: STUDENT IN AN ORGANIZED HEALTH CARE EDUCATION/TRAINING PROGRAM
Payer: OTHER MISCELLANEOUS

## 2023-02-28 PROCEDURE — 97110 THERAPEUTIC EXERCISES: CPT | Mod: GP

## 2023-03-07 ENCOUNTER — HOSPITAL ENCOUNTER (OUTPATIENT)
Dept: PHYSICAL THERAPY | Facility: CLINIC | Age: 53
Setting detail: THERAPIES SERIES
Discharge: HOME | End: 2023-03-07
Attending: STUDENT IN AN ORGANIZED HEALTH CARE EDUCATION/TRAINING PROGRAM
Payer: OTHER MISCELLANEOUS

## 2023-03-07 PROCEDURE — 97110 THERAPEUTIC EXERCISES: CPT | Mod: GP

## 2023-03-14 ENCOUNTER — HOSPITAL ENCOUNTER (OUTPATIENT)
Dept: PHYSICAL THERAPY | Facility: CLINIC | Age: 53
Setting detail: THERAPIES SERIES
Discharge: HOME | End: 2023-03-14
Attending: STUDENT IN AN ORGANIZED HEALTH CARE EDUCATION/TRAINING PROGRAM
Payer: OTHER MISCELLANEOUS

## 2023-03-14 PROCEDURE — 97110 THERAPEUTIC EXERCISES: CPT | Mod: GP

## 2023-03-21 ENCOUNTER — HOSPITAL ENCOUNTER (OUTPATIENT)
Dept: PHYSICAL THERAPY | Facility: CLINIC | Age: 53
Setting detail: THERAPIES SERIES
Discharge: HOME | End: 2023-03-21
Attending: STUDENT IN AN ORGANIZED HEALTH CARE EDUCATION/TRAINING PROGRAM
Payer: OTHER MISCELLANEOUS

## 2023-03-21 PROCEDURE — 97530 THERAPEUTIC ACTIVITIES: CPT | Mod: GP

## 2023-03-21 PROCEDURE — 97110 THERAPEUTIC EXERCISES: CPT | Mod: GP

## 2023-03-24 DIAGNOSIS — I10 HYPERTENSION GOAL BP (BLOOD PRESSURE) < 140/90: ICD-10-CM

## 2023-03-24 RX ORDER — AMLODIPINE BESYLATE 5 MG/1
TABLET ORAL
Qty: 90 TABLET | Refills: 3 | Status: SHIPPED | OUTPATIENT
Start: 2023-03-24 | End: 2024-03-18

## 2023-03-24 NOTE — TELEPHONE ENCOUNTER
Routing refill request to provider for review/approval because:    Requested Prescriptions   Pending Prescriptions Disp Refills    amLODIPine (NORVASC) 5 MG tablet 90 tablet 3     Sig: TAKE 1 TABLET BY MOUTH ONCE DAILY NEEDS  OFFICE  VISIT  FOR  ANY  FURTHER  REFILLS       Calcium Channel Blockers Protocol  Failed - 3/24/2023 10:00 AM        Failed - Blood pressure under 140/90 in past 12 months     BP Readings from Last 3 Encounters:   02/01/23 (!) 148/84   01/19/23 134/83   04/29/22 (!) 128/98                 Failed - Normal serum creatinine on file in past 12 months     Recent Labs   Lab Test 01/18/23  2210   CR 1.29*       Ok to refill medication if creatinine is low

## 2023-03-30 DIAGNOSIS — I10 HYPERTENSION GOAL BP (BLOOD PRESSURE) < 140/90: ICD-10-CM

## 2023-03-31 NOTE — TELEPHONE ENCOUNTER
"Requested Prescriptions   Pending Prescriptions Disp Refills    atorvastatin (LIPITOR) 40 MG tablet 90 tablet 3     Sig: TAKE 1 TABLET BY MOUTH ONCE DAILY NEEDS  OFFICE  VISIT  FOR  ANY  FURTHER  REFILLS       Statins Protocol Failed - 3/30/2023  5:17 PM        Failed - LDL on file in past 12 months     Recent Labs   Lab Test 03/25/22  1044   LDL 78             Passed - No abnormal creatine kinase in past 12 months     No lab results found.             Passed - Recent (12 mo) or future (30 days) visit within the authorizing provider's specialty     Patient has had an office visit with the authorizing provider or a provider within the authorizing providers department within the previous 12 mos or has a future within next 30 days. See \"Patient Info\" tab in inbasket, or \"Choose Columns\" in Meds & Orders section of the refill encounter.              Passed - Medication is active on med list        Passed - Patient is age 18 or older              Diana Soni RN  "

## 2023-04-03 ENCOUNTER — HOSPITAL ENCOUNTER (OUTPATIENT)
Dept: PHYSICAL THERAPY | Facility: CLINIC | Age: 53
Setting detail: THERAPIES SERIES
Discharge: HOME | End: 2023-04-03
Attending: STUDENT IN AN ORGANIZED HEALTH CARE EDUCATION/TRAINING PROGRAM
Payer: OTHER MISCELLANEOUS

## 2023-04-03 PROCEDURE — 97110 THERAPEUTIC EXERCISES: CPT | Mod: GP

## 2023-04-03 RX ORDER — ATORVASTATIN CALCIUM 40 MG/1
TABLET, FILM COATED ORAL
Qty: 90 TABLET | Refills: 3 | Status: SHIPPED | OUTPATIENT
Start: 2023-04-03 | End: 2024-03-29

## 2023-04-05 ENCOUNTER — OFFICE VISIT (OUTPATIENT)
Dept: FAMILY MEDICINE | Facility: CLINIC | Age: 53
End: 2023-04-05
Payer: COMMERCIAL

## 2023-04-05 VITALS
BODY MASS INDEX: 25.42 KG/M2 | OXYGEN SATURATION: 98 % | HEIGHT: 77 IN | DIASTOLIC BLOOD PRESSURE: 80 MMHG | TEMPERATURE: 97.2 F | HEART RATE: 56 BPM | RESPIRATION RATE: 16 BRPM | WEIGHT: 215.3 LBS | SYSTOLIC BLOOD PRESSURE: 122 MMHG

## 2023-04-05 DIAGNOSIS — E78.5 HYPERLIPIDEMIA LDL GOAL <130: ICD-10-CM

## 2023-04-05 DIAGNOSIS — Z11.4 SCREENING FOR HIV (HUMAN IMMUNODEFICIENCY VIRUS): ICD-10-CM

## 2023-04-05 DIAGNOSIS — Z11.59 NEED FOR HEPATITIS C SCREENING TEST: ICD-10-CM

## 2023-04-05 DIAGNOSIS — I10 HYPERTENSION GOAL BP (BLOOD PRESSURE) < 140/90: ICD-10-CM

## 2023-04-05 DIAGNOSIS — Z12.5 SCREENING FOR PROSTATE CANCER: ICD-10-CM

## 2023-04-05 DIAGNOSIS — C43.9 MELANOMA OF SKIN (H): ICD-10-CM

## 2023-04-05 DIAGNOSIS — Z80.42 FAMILY HX OF PROSTATE CANCER: ICD-10-CM

## 2023-04-05 DIAGNOSIS — Z00.00 ROUTINE GENERAL MEDICAL EXAMINATION AT A HEALTH CARE FACILITY: Primary | ICD-10-CM

## 2023-04-05 LAB
ANION GAP SERPL CALCULATED.3IONS-SCNC: 8 MMOL/L (ref 7–15)
BUN SERPL-MCNC: 15.5 MG/DL (ref 6–20)
CALCIUM SERPL-MCNC: 9.2 MG/DL (ref 8.6–10)
CHLORIDE SERPL-SCNC: 102 MMOL/L (ref 98–107)
CHOLEST SERPL-MCNC: 168 MG/DL
CREAT SERPL-MCNC: 1.05 MG/DL (ref 0.67–1.17)
DEPRECATED HCO3 PLAS-SCNC: 27 MMOL/L (ref 22–29)
GFR SERPL CREATININE-BSD FRML MDRD: 84 ML/MIN/1.73M2
GLUCOSE SERPL-MCNC: 96 MG/DL (ref 70–99)
HDLC SERPL-MCNC: 53 MG/DL
LDLC SERPL CALC-MCNC: 65 MG/DL
NONHDLC SERPL-MCNC: 115 MG/DL
POTASSIUM SERPL-SCNC: 4.4 MMOL/L (ref 3.4–5.3)
SODIUM SERPL-SCNC: 137 MMOL/L (ref 136–145)
TRIGL SERPL-MCNC: 251 MG/DL

## 2023-04-05 PROCEDURE — 99396 PREV VISIT EST AGE 40-64: CPT | Performed by: STUDENT IN AN ORGANIZED HEALTH CARE EDUCATION/TRAINING PROGRAM

## 2023-04-05 PROCEDURE — 80061 LIPID PANEL: CPT | Performed by: STUDENT IN AN ORGANIZED HEALTH CARE EDUCATION/TRAINING PROGRAM

## 2023-04-05 PROCEDURE — 36415 COLL VENOUS BLD VENIPUNCTURE: CPT | Performed by: STUDENT IN AN ORGANIZED HEALTH CARE EDUCATION/TRAINING PROGRAM

## 2023-04-05 PROCEDURE — 80048 BASIC METABOLIC PNL TOTAL CA: CPT | Performed by: STUDENT IN AN ORGANIZED HEALTH CARE EDUCATION/TRAINING PROGRAM

## 2023-04-05 PROCEDURE — 87389 HIV-1 AG W/HIV-1&-2 AB AG IA: CPT | Performed by: STUDENT IN AN ORGANIZED HEALTH CARE EDUCATION/TRAINING PROGRAM

## 2023-04-05 PROCEDURE — 86803 HEPATITIS C AB TEST: CPT | Performed by: STUDENT IN AN ORGANIZED HEALTH CARE EDUCATION/TRAINING PROGRAM

## 2023-04-05 ASSESSMENT — ENCOUNTER SYMPTOMS
PARESTHESIAS: 0
CONSTIPATION: 0
JOINT SWELLING: 0
DIARRHEA: 0
ARTHRALGIAS: 0
ABDOMINAL PAIN: 0
FREQUENCY: 0
SHORTNESS OF BREATH: 0
DIZZINESS: 0
DYSURIA: 0
NAUSEA: 0
CHILLS: 0
SORE THROAT: 0
MYALGIAS: 0
NERVOUS/ANXIOUS: 0
HEMATOCHEZIA: 0
WEAKNESS: 0
PALPITATIONS: 0
COUGH: 0
FEVER: 0
EYE PAIN: 0
HEARTBURN: 0
HEADACHES: 0
HEMATURIA: 0

## 2023-04-05 ASSESSMENT — PAIN SCALES - GENERAL: PAINLEVEL: NO PAIN (0)

## 2023-04-05 NOTE — PROGRESS NOTES
SUBJECTIVE:   CC: Cyrus is an 52 year old who presents for preventative health visit.       4/5/2023    12:40 PM   Additional Questions   Roomed by THOMAS Ng LPn         4/5/2023    12:40 PM   Patient Reported Additional Medications   Patient reports taking the following new medications apple cider vinegar, tumeric     Patient has been advised of split billing requirements and indicates understanding: Yes  Healthy Habits:     Getting at least 3 servings of Calcium per day:  NO    Bi-annual eye exam:  NO    Dental care twice a year:  Yes    Sleep apnea or symptoms of sleep apnea:  None    Diet:  Regular (no restrictions)    Frequency of exercise:  None    Taking medications regularly:  Yes    Medication side effects:  None    PHQ-2 Total Score: 0    Additional concerns today:  No    No acute concerns today.  Patient shoulder improved with physical therapy.  Recently had melanoma removed.      Today's PHQ-2 Score:       4/5/2023    12:32 PM   PHQ-2 ( 1999 [employer])   Q1: Little interest or pleasure in doing things 0   Q2: Feeling down, depressed or hopeless 0   PHQ-2 Score 0   Q1: Little interest or pleasure in doing things Not at all   Q2: Feeling down, depressed or hopeless Not at all   PHQ-2 Score 0           Social History     Tobacco Use     Smoking status: Former     Smokeless tobacco: Former     Types: Chew     Tobacco comments:     1 tin every 3 days   Vaping Use     Vaping status: Not on file   Substance Use Topics     Alcohol use: Yes     Comment: 2 a day              4/5/2023    12:32 PM   Alcohol Use   Prescreen: >3 drinks/day or >7 drinks/week? No       Last PSA:   PSA   Date Value Ref Range Status   01/11/2021 0.77 0 - 4 ug/L Final     Comment:     Assay Method:  Chemiluminescence using Siemens Vista analyzer     Prostate Specific Antigen Screen   Date Value Ref Range Status   03/25/2022 0.70 0.00 - 4.00 ug/L Final       Reviewed orders with patient. Reviewed health maintenance and updated orders  accordingly - Yes  Lab work is in process    Reviewed and updated as needed this visit by clinical staff   Tobacco  Allergies  Meds              Reviewed and updated as needed this visit by Provider     Meds             Past Medical History:   Diagnosis Date     Hypertension      Melanoma of skin (H) 4/5/2023     Primary osteoarthritis of right hip 4/23/2022     Uncomplicated asthma      Unspecified asthma(493.90)     Asthma      Past Surgical History:   Procedure Laterality Date     COLONOSCOPY N/A 12/10/2018    Procedure: COLONOSCOPY;  Surgeon: Zoran Montgomery MD;  Location: [facility]     ESOPHAGOSCOPY, GASTROSCOPY, DUODENOSCOPY (EGD), COMBINED N/A 4/29/2022    Procedure: ESOPHAGOGASTRODUODENOSCOPY, WITH BIOPSY;  Surgeon: Vinicius Baker DO;  Location: [facility]     HC VASECTOMY UNILAT/BILAT W POSTOP SEMEN  2000    Vasectomy     PHACOEMULSIFICATION WITH STANDARD INTRAOCULAR LENS IMPLANT Right 1/31/2019    Procedure: PHACOEMULSIFICATION WITH STANDARD INTRAOCULAR LENS IMPLANT RIGHT;  Surgeon: Loyd Tavarez MD;  Location: [facility]       Review of Systems   Constitutional: Negative for chills and fever.   HENT: Negative for congestion, ear pain, hearing loss and sore throat.    Eyes: Negative for pain and visual disturbance.   Respiratory: Negative for cough and shortness of breath.    Cardiovascular: Negative for chest pain, palpitations and peripheral edema.   Gastrointestinal: Negative for abdominal pain, constipation, diarrhea, heartburn, hematochezia and nausea.   Genitourinary: Negative for dysuria, frequency, genital sores, hematuria, impotence, penile discharge and urgency.   Musculoskeletal: Negative for arthralgias, joint swelling and myalgias.   Skin: Negative for rash.   Neurological: Negative for dizziness, weakness, headaches and paresthesias.   Psychiatric/Behavioral: Negative for mood changes. The patient is not nervous/anxious.      OBJECTIVE:   /80 (BP Location: Right arm,  "Patient Position: Chair)   Pulse 56   Temp 97.2  F (36.2  C) (Temporal)   Resp 16   Ht 1.943 m (6' 4.5\")   Wt 97.7 kg (215 lb 4.8 oz)   SpO2 98%   BMI 25.87 kg/m      Physical Exam  GENERAL: healthy, alert and no distress  EYES: Eyes grossly normal to inspection, PERRL and conjunctivae and sclerae normal  HENT: ear canals and TM's normal, nose and mouth without ulcers or lesions  NECK: no adenopathy, no asymmetry, masses, or scars and thyroid normal to palpation  RESP: lungs clear to auscultation - no rales, rhonchi or wheezes  CV: regular rate and rhythm, normal S1 S2, no S3 or S4, no murmur, click or rub, no peripheral edema and peripheral pulses strong  ABDOMEN: soft, nontender, no hepatosplenomegaly, no masses and bowel sounds normal  MS: no gross musculoskeletal defects noted, no edema  SKIN: Right upper back scar and healing incision on shoulder from melanoma removal  NEURO: Normal strength and tone, mentation intact and speech normal  PSYCH: mentation appears normal, affect normal/bright    Diagnostic Test Results:  Labs reviewed in Epic    ASSESSMENT/PLAN:   Cyrus was seen today for physical.    Diagnoses and all orders for this visit:    Routine general medical examination at a health care facility    Hypertension goal BP (blood pressure) < 140/90  -     Basic metabolic panel  (Ca, Cl, CO2, Creat, Gluc, K, Na, BUN); Future  -     Basic metabolic panel  (Ca, Cl, CO2, Creat, Gluc, K, Na, BUN)    Screening for HIV (human immunodeficiency virus)  -     HIV Antigen Antibody Combo; Future  -     HIV Antigen Antibody Combo    Need for hepatitis C screening test  -     Hepatitis C Screen Reflex to HCV RNA Quant and Genotype; Future  -     Hepatitis C Screen Reflex to HCV RNA Quant and Genotype    Hyperlipidemia LDL goal <130  -     Lipid panel reflex to direct LDL Non-fasting; Future  -     Lipid panel reflex to direct LDL Non-fasting    Screening for prostate cancer    Family hx of prostate " cancer    Melanoma of skin (H)  Following with dermatology.  Every 3-month skin checks.    Other orders  -     REVIEW OF HEALTH MAINTENANCE PROTOCOL ORDERS        Patient has been advised of split billing requirements and indicates understanding: Yes      COUNSELING:   Reviewed preventive health counseling, as reflected in patient instructions       Consider AAA screening for ages 65-75 and > 100 cig smoking history or family history of AAA       Regular exercise       Healthy diet/nutrition       Vision screening       Hearing screening       Immunizations       Aspirin prophylaxis        Alcohol Use        Safe sex practices/STD prevention       Consider Hep C screening for all patients one time for ages 18-79 years       HIV screeninx in teen years, 1x in adult years, and at intervals if high risk       Colorectal cancer screening       Prostate cancer screening       Consider lung cancer screening for ages 55-80 years (77 for Medicare) and 20 pack-year smoking history        He reports that he has quit smoking. He has quit using smokeless tobacco.  His smokeless tobacco use included chew.            Jack Starkey MD  [facility]

## 2023-04-06 LAB
HCV AB SERPL QL IA: NONREACTIVE
HIV 1+2 AB+HIV1 P24 AG SERPL QL IA: NONREACTIVE

## 2023-05-22 ENCOUNTER — TELEPHONE (OUTPATIENT)
Dept: FAMILY MEDICINE | Facility: CLINIC | Age: 53
End: 2023-05-22
Payer: COMMERCIAL

## 2023-05-26 NOTE — TELEPHONE ENCOUNTER
Form faxed:  [phone]    Original mailed to employee, copy sent to scanning.    Mana Carrasco XRO/

## 2024-03-18 DIAGNOSIS — I10 HYPERTENSION GOAL BP (BLOOD PRESSURE) < 140/90: ICD-10-CM

## 2024-03-18 RX ORDER — AMLODIPINE BESYLATE 5 MG/1
TABLET ORAL
Qty: 90 TABLET | Refills: 0 | Status: SHIPPED | OUTPATIENT
Start: 2024-03-18 | End: 2024-04-09

## 2024-03-29 DIAGNOSIS — I10 HYPERTENSION GOAL BP (BLOOD PRESSURE) < 140/90: ICD-10-CM

## 2024-03-29 RX ORDER — ATORVASTATIN CALCIUM 40 MG/1
TABLET, FILM COATED ORAL
Qty: 90 TABLET | Refills: 0 | Status: SHIPPED | OUTPATIENT
Start: 2024-03-29 | End: 2024-04-09

## 2024-04-08 SDOH — HEALTH STABILITY: PHYSICAL HEALTH: ON AVERAGE, HOW MANY DAYS PER WEEK DO YOU ENGAGE IN MODERATE TO STRENUOUS EXERCISE (LIKE A BRISK WALK)?: 5 DAYS

## 2024-04-08 SDOH — HEALTH STABILITY: PHYSICAL HEALTH: ON AVERAGE, HOW MANY MINUTES DO YOU ENGAGE IN EXERCISE AT THIS LEVEL?: 120 MIN

## 2024-04-08 ASSESSMENT — SOCIAL DETERMINANTS OF HEALTH (SDOH): HOW OFTEN DO YOU GET TOGETHER WITH FRIENDS OR RELATIVES?: TWICE A WEEK

## 2024-04-09 ENCOUNTER — OFFICE VISIT (OUTPATIENT)
Dept: FAMILY MEDICINE | Facility: CLINIC | Age: 54
End: 2024-04-09
Payer: COMMERCIAL

## 2024-04-09 VITALS
DIASTOLIC BLOOD PRESSURE: 88 MMHG | WEIGHT: 201 LBS | HEART RATE: 62 BPM | TEMPERATURE: 97.6 F | BODY MASS INDEX: 23.73 KG/M2 | RESPIRATION RATE: 16 BRPM | OXYGEN SATURATION: 99 % | SYSTOLIC BLOOD PRESSURE: 132 MMHG | HEIGHT: 77 IN

## 2024-04-09 DIAGNOSIS — C43.9 MELANOMA OF SKIN (H): ICD-10-CM

## 2024-04-09 DIAGNOSIS — Z87.442 HISTORY OF KIDNEY STONES: ICD-10-CM

## 2024-04-09 DIAGNOSIS — M16.11 PRIMARY OSTEOARTHRITIS OF RIGHT HIP: ICD-10-CM

## 2024-04-09 DIAGNOSIS — Z00.00 ROUTINE GENERAL MEDICAL EXAMINATION AT A HEALTH CARE FACILITY: Primary | ICD-10-CM

## 2024-04-09 DIAGNOSIS — Z80.42 FAMILY HX OF PROSTATE CANCER: ICD-10-CM

## 2024-04-09 DIAGNOSIS — E78.5 HYPERLIPIDEMIA LDL GOAL <130: ICD-10-CM

## 2024-04-09 DIAGNOSIS — I10 HYPERTENSION GOAL BP (BLOOD PRESSURE) < 140/90: ICD-10-CM

## 2024-04-09 LAB
ALBUMIN SERPL BCG-MCNC: 4.4 G/DL (ref 3.5–5.2)
ALP SERPL-CCNC: 76 U/L (ref 40–150)
ALT SERPL W P-5'-P-CCNC: 29 U/L (ref 0–70)
ANION GAP SERPL CALCULATED.3IONS-SCNC: 11 MMOL/L (ref 7–15)
AST SERPL W P-5'-P-CCNC: 24 U/L (ref 0–45)
BILIRUB SERPL-MCNC: 1.2 MG/DL
BUN SERPL-MCNC: 16.2 MG/DL (ref 6–20)
CALCIUM SERPL-MCNC: 9.8 MG/DL (ref 8.6–10)
CHLORIDE SERPL-SCNC: 104 MMOL/L (ref 98–107)
CHOLEST SERPL-MCNC: 158 MG/DL
CREAT SERPL-MCNC: 1.14 MG/DL (ref 0.67–1.17)
DEPRECATED HCO3 PLAS-SCNC: 26 MMOL/L (ref 22–29)
EGFRCR SERPLBLD CKD-EPI 2021: 76 ML/MIN/1.73M2
FASTING STATUS PATIENT QL REPORTED: YES
GLUCOSE SERPL-MCNC: 97 MG/DL (ref 70–99)
HDLC SERPL-MCNC: 60 MG/DL
LDLC SERPL CALC-MCNC: 69 MG/DL
NONHDLC SERPL-MCNC: 98 MG/DL
POTASSIUM SERPL-SCNC: 4.3 MMOL/L (ref 3.4–5.3)
PROT SERPL-MCNC: 6.9 G/DL (ref 6.4–8.3)
PSA SERPL DL<=0.01 NG/ML-MCNC: 0.74 NG/ML (ref 0–3.5)
SODIUM SERPL-SCNC: 141 MMOL/L (ref 135–145)
TRIGL SERPL-MCNC: 145 MG/DL

## 2024-04-09 PROCEDURE — G0103 PSA SCREENING: HCPCS | Performed by: STUDENT IN AN ORGANIZED HEALTH CARE EDUCATION/TRAINING PROGRAM

## 2024-04-09 PROCEDURE — 99396 PREV VISIT EST AGE 40-64: CPT | Performed by: STUDENT IN AN ORGANIZED HEALTH CARE EDUCATION/TRAINING PROGRAM

## 2024-04-09 PROCEDURE — 80053 COMPREHEN METABOLIC PANEL: CPT | Performed by: STUDENT IN AN ORGANIZED HEALTH CARE EDUCATION/TRAINING PROGRAM

## 2024-04-09 PROCEDURE — 36415 COLL VENOUS BLD VENIPUNCTURE: CPT | Performed by: STUDENT IN AN ORGANIZED HEALTH CARE EDUCATION/TRAINING PROGRAM

## 2024-04-09 PROCEDURE — 80061 LIPID PANEL: CPT | Performed by: STUDENT IN AN ORGANIZED HEALTH CARE EDUCATION/TRAINING PROGRAM

## 2024-04-09 RX ORDER — ATORVASTATIN CALCIUM 40 MG/1
TABLET, FILM COATED ORAL
Qty: 90 TABLET | Refills: 3 | Status: SHIPPED | OUTPATIENT
Start: 2024-04-09

## 2024-04-09 RX ORDER — MELOXICAM 15 MG/1
15 TABLET ORAL DAILY
Qty: 90 TABLET | Refills: 1 | Status: SHIPPED | OUTPATIENT
Start: 2024-04-09

## 2024-04-09 RX ORDER — AMLODIPINE BESYLATE 5 MG/1
TABLET ORAL
Qty: 90 TABLET | Refills: 3 | Status: SHIPPED | OUTPATIENT
Start: 2024-04-09

## 2024-04-09 NOTE — PATIENT INSTRUCTIONS
Preventive Care Advice   This is general advice given by our system to help you stay healthy. However, your care team may have specific advice just for you. Please talk to your care team about your preventive care needs.  Nutrition  Eat 5 or more servings of fruits and vegetables each day.  Try wheat bread, brown rice and whole grain pasta (instead of white bread, rice, and pasta).  Get enough calcium and vitamin D. Check the label on foods and aim for 100% of the RDA (recommended daily allowance).  Lifestyle  Exercise at least 150 minutes each week   (30 minutes a day, 5 days a week).  Do muscle strengthening activities 2 days a week. These help control your weight and prevent disease.  No smoking.  Wear sunscreen to prevent skin cancer.  Have a dental exam and cleaning every 6 months.  Yearly exams  See your health care team every year to talk about:  Any changes in your health.  Any medicines your care team has prescribed.  Preventive care, family planning, and ways to prevent chronic diseases.  Shots (vaccines)   HPV shots (up to age 26), if you've never had them before.  Hepatitis B shots (up to age 59), if you've never had them before.  COVID-19 shot: Get this shot when it's due.  Flu shot: Get a flu shot every year.  Tetanus shot: Get a tetanus shot every 10 years.  Pneumococcal, hepatitis A, and RSV shots: Ask your care team if you need these based on your risk.  Shingles shot (for age 50 and up).  General health tests  Diabetes screening:  Starting at age 35, Get screened for diabetes at least every 3 years.  If you are younger than age 35, ask your care team if you should be screened for diabetes.  Cholesterol test: At age 39, start having a cholesterol test every 5 years, or more often if advised.  Bone density scan (DEXA): At age 50, ask your care team if you should have this scan for osteoporosis (brittle bones).  Hepatitis C: Get tested at least once in your life.  STIs (sexually transmitted  infections)  Before age 24: Ask your care team if you should be screened for STIs.  After age 24: Get screened for STIs if you're at risk. You are at risk for STIs (including HIV) if:  You are sexually active with more than one person.  You don't use condoms every time.  You or a partner was diagnosed with a sexually transmitted infection.  If you are at risk for HIV, ask about PrEP medicine to prevent HIV.  Get tested for HIV at least once in your life, whether you are at risk for HIV or not.  Cancer screening tests  Cervical cancer screening: If you have a cervix, begin getting regular cervical cancer screening tests at age 21. Most people who have regular screenings with normal results can stop after age 65. Talk about this with your provider.  Breast cancer scan (mammogram): If you've ever had breasts, begin having regular mammograms starting at age 40. This is a scan to check for breast cancer.  Colon cancer screening: It is important to start screening for colon cancer at age 45.  Have a colonoscopy test every 10 years (or more often if you're at risk) Or, ask your provider about stool tests like a FIT test every year or Cologuard test every 3 years.  To learn more about your testing options, visit: https://www.[employer]/997686.pdf.  For help making a decision, visit: https://bit.ly/aq20732.  Prostate cancer screening test: If you have a prostate and are age 55 to 69, ask your provider if you would benefit from a yearly prostate cancer screening test.  Lung cancer screening: If you are a current or former smoker age 50 to 80, ask your care team if ongoing lung cancer screenings are right for you.  For informational purposes only. Not to replace the advice of your health care provider. Copyright   2023 [employer]. All rights reserved. Clinically reviewed by the [facility] Transitions Program. [employer] [id number] - REV 01/24.

## 2024-04-09 NOTE — PROGRESS NOTES
Preventive Care Visit  Formerly [facility]  Jack Starkey MD, Family Medicine  Apr 9, 2024      Assessment & Plan   Problem List Items Addressed This Visit          Endocrine    Hyperlipidemia LDL goal <130    Relevant Medications    atorvastatin (LIPITOR) 40 MG tablet    Other Relevant Orders    Lipid panel reflex to direct LDL Fasting       Circulatory    Hypertension goal BP (blood pressure) < 140/90    Relevant Medications    atorvastatin (LIPITOR) 40 MG tablet    amLODIPine (NORVASC) 5 MG tablet    Other Relevant Orders    Comprehensive metabolic panel (BMP + Alb, Alk Phos, ALT, AST, Total. Bili, TP)       Musculoskeletal and Integumentary    Primary osteoarthritis of right hip    Relevant Medications    meloxicam (MOBIC) 15 MG tablet    Other Relevant Orders    Comprehensive metabolic panel (BMP + Alb, Alk Phos, ALT, AST, Total. Bili, TP)    Melanoma of skin (H)       Other    Family hx of prostate cancer    Relevant Orders    PSA, screen    History of kidney stones     Other Visit Diagnoses       Routine general medical examination at a health care facility    -  Primary           Screening and immunization discussed. Repeat PSA and he doesn't want vaccines today. Will continue to follow with dermatology for skin checks with strong hx of melanoma. No new lesions noted. Will do trial of mobic instead of ibuprofen for his hip pain. Repeat labs today. BP stable. And refills sent. Continue atorvastatin.     Patient has been advised of split billing requirements and indicates understanding: Yes       Counseling  Appropriate preventive services were discussed with this patient, including applicable screening as appropriate for fall prevention, nutrition, physical activity, Tobacco-use cessation, weight loss and cognition.  Checklist reviewing preventive services available has been given to the patient.  Reviewed patient's diet, addressing concerns and/or questions.         Subjective    Cyrus is a 53 year old, presenting for the following:  Physical        4/9/2024     9:20 AM   Additional Questions   Roomed by [facility] Care Directive  Patient does not have a Health Care Directive or Living Will: Discussed advance care planning with patient; information given to patient to review.    HPI            4/8/2024   General Health   How would you rate your overall physical health? Good   Feel stress (tense, anxious, or unable to sleep) Not at all         4/8/2024   Nutrition   Three or more servings of calcium each day? (!) NO   Diet: Regular (no restrictions)   How many servings of fruit and vegetables per day? (!) 2-3   How many sweetened beverages each day? (!) 2         4/8/2024   Exercise   Days per week of moderate/strenous exercise 5 days   Average minutes spent exercising at this level 120 min         4/8/2024   Social Factors   Frequency of gathering with friends or relatives Twice a week   Worry food won't last until get money to buy more No   Food not last or not have enough money for food? No   Do you have housing?  Yes   Are you worried about losing your housing? No   Lack of transportation? No   Unable to get utilities (heat,electricity)? No         4/8/2024   Fall Risk   Fallen 2 or more times in the past year? No   Trouble with walking or balance? No          4/8/2024   Dental   Dentist two times every year? Yes         4/8/2024   TB Screening   Were you born outside of the [place]? No         Today's PHQ-2 Score:       4/8/2024    11:05 AM   PHQ-2 ( 1999 [employer])   Q1: Little interest or pleasure in doing things 0   Q2: Feeling down, depressed or hopeless 0   PHQ-2 Score 0   Q1: Little interest or pleasure in doing things Not at all   Q2: Feeling down, depressed or hopeless Not at all   PHQ-2 Score 0           4/8/2024   Substance Use   Alcohol more than 3/day or more than 7/wk No   Do you use any other substances recreationally? No     Social History     Tobacco Use    Smoking  status: Former    Smokeless tobacco: Former     Types: Chew    Tobacco comments:     1 tin every 3 days   Substance Use Topics    Alcohol use: Yes     Comment: 2 a day     Drug use: No           2024   STI Screening   New sexual partner(s) since last STI/HIV test? No   Last PSA:   PSA   Date Value Ref Range Status   2021 0.77 0 - 4 ug/L Final     Comment:     Assay Method:  Chemiluminescence using Siemens Vista analyzer     Prostate Specific Antigen Screen   Date Value Ref Range Status   2022 0.70 0.00 - 4.00 ug/L Final     ASCVD Risk   The 10-year ASCVD risk score (Mitchell HUI, et al., 2019) is: 5.4%    Values used to calculate the score:      Age: 53 years      Sex: Male      Is Non- : No      Diabetic: No      Tobacco smoker: No      Systolic Blood Pressure: 132 mmHg      Is BP treated: Yes      HDL Cholesterol: 53 mg/dL      Total Cholesterol: 168 mg/dL    Fracture Risk Assessment Tool  Link to Frax Calculator  Use the information below to complete the Frax calculator  : 1970  Sex: male  Weight (kg): 91.2 kg (actual weight)  Height (cm): 194.3 cm  Previous Fragility Fracture:  No  History of parent with fractured hip:  No  Current Smoking:  No  Patient has been on glucocorticoids for more than 3 months (5mg/day or more): No  Rheumatoid Arthritis on Problem List:  No  Secondary Osteoporosis on Problem List:  No  Consumes 3 or more units of alcohol per day: No  Femoral Neck BMD (g/cm2)           Reviewed and updated as needed this visit by Provider                    Past Medical History:   Diagnosis Date    Hypertension     Melanoma of skin (H) 2023    Primary osteoarthritis of right hip 2022    Uncomplicated asthma     Unspecified asthma(493.90)     Asthma     Past Surgical History:   Procedure Laterality Date    COLONOSCOPY N/A 12/10/2018    Procedure: COLONOSCOPY;  Surgeon: Zoran Montgomery MD;  Location:  GI    ESOPHAGOSCOPY, GASTROSCOPY,  "DUODENOSCOPY (EGD), COMBINED N/A 4/29/2022    Procedure: ESOPHAGOGASTRODUODENOSCOPY, WITH BIOPSY;  Surgeon: Vinicius Baker DO;  Location: [facility]    HC VASECTOMY UNILAT/BILAT W POSTOP SEMEN  2000    Vasectomy    PHACOEMULSIFICATION WITH STANDARD INTRAOCULAR LENS IMPLANT Right 1/31/2019    Procedure: PHACOEMULSIFICATION WITH STANDARD INTRAOCULAR LENS IMPLANT RIGHT;  Surgeon: Loyd Tavarez MD;  Location: [facility] OR         Review of Systems  Constitutional, HEENT, cardiovascular, pulmonary, GI, , musculoskeletal, neuro, skin, endocrine and psych systems are negative, except as otherwise noted.     Objective    Exam  /88   Pulse 62   Temp 97.6  F (36.4  C) (Temporal)   Resp 16   Ht 1.943 m (6' 4.5\")   Wt 91.2 kg (201 lb)   SpO2 99%   BMI 24.15 kg/m     Estimated body mass index is 24.15 kg/m  as calculated from the following:    Height as of this encounter: 1.943 m (6' 4.5\").    Weight as of this encounter: 91.2 kg (201 lb).    Physical Exam  GENERAL: alert and no distress  EYES: Eyes grossly normal to inspection, PERRL and conjunctivae and sclerae normal  HENT: ear canals and TM's normal, nose and mouth without ulcers or lesions  NECK: no adenopathy, no asymmetry, masses, or scars  RESP: lungs clear to auscultation - no rales, rhonchi or wheezes  CV: regular rate and rhythm, normal S1 S2, no S3 or S4, no murmur, click or rub, no peripheral edema  ABDOMEN: soft, nontender, no hepatosplenomegaly, no masses and bowel sounds normal  MS: no gross musculoskeletal defects noted, no edema  SKIN: no suspicious lesions or rashes  NEURO: Normal strength and tone, mentation intact and speech normal  PSYCH: mentation appears normal, affect normal/bright        Signed Electronically by: Jack Starkey MD    "

## 2024-06-17 NOTE — PROGRESS NOTES
DISCHARGE  Reason for Discharge: Patient has failed to schedule further appointments.    Equipment Issued: Red and green therabands.    Discharge Plan: Patient to continue home program.    OP PT Daily Documentation    Row Name 04/03/23 1700       Appointment Info   Signing clinician's name / credentials Francisco Villarreal PT, DPT       Visits Used 6       Progress Note/Certification   Progress Note Due Date 04/04/23           Present No        ID or First/Last Name No  present today.       Subjective Report   Subjective Report Patient in good spirits during today's session. Patient reports good adherence to his HEP since his previous session without LUE shoulder pain exacerbation. Patient reports improvement of LUE shoulder function with work related lifting demands since his previous session. Patient reports LUE shoulder AROM IR discomfort when reaching into his back pocket since his previous session.       Objective Measures   Objective Measures Objective Measure 1;Objective Measure 2       Objective Measure 1   Objective Measure SPADI       Details Patient scored 18.46 on his SPADI re-assessment during today's session.       Objective Measure 2   Objective Measure Pain       Details Patient reports 1/10 LUE shoulder pain at the beginning of today's session without exacerbation from performance of today's selected exercises. Patient reports having LUE shoulder IR AROM pain when reaching into his back pocket with his left hand since his previous session.       Treatment Interventions (PT)   Interventions Therapeutic Procedure/Exercise       Therapeutic Procedure/Exercise   Therapeutic Procedures: strength, endurance, ROM, flexibility minutes (02940) 25       Skilled Intervention Selection, instruction, and modification of selected exercises for optimal therapeutic benefit. Patient re-education to progress BUE shoulder and scapular strengthening exercises above shoulder level  as tolerated. Patient education on stretch vs pain sensation with LUE shoulder IR stretch to allow for painfree improved LUE shoulder IR progression.       Patient Response/Progress No adverse response with appropriate modifications of selected exercises during today's session.       Education   Learner/Method Patient       Education Comments Patient understanding of future therapeutic progression.       RETIRED Readiness Eager       RETIRED Method Explanation;Demonstration       RETIRED Response Demonstrates Understanding       Plan   Homework Home Exercise Program       Home program AAROM for LUE shoulder scaption 1x10 each with releases as tolerated; Green theraband left shoulder IR and ER's with shoulder level BUE shoulder flexion 1x10 each; Left shoulder scapular alphabet at shoulder level with 1-2 pound weights x2 repetitions. Red theraband shoulder retractions 1x10; Green theraband bent elbow rows and shoulder extensions with BUE's 1x10 each; Red theraband lower trap wall slide raises with LLE 1x10; 1-2 pound CW and CCW wall circles 1x10 each direction at shoulder level and 1x10 at above shoulder level. Perform exercises 2 times a day as tolerated, adding weight to scapular strengthening exercises as tolerated. Progress to above shoulder level with selected scapular strengthening exercises as tolerated.       Plan for next session Progress pain free left shoulder ROM and strengthening, as well as BUE scapular strengthening as tolerated by the patient. Re-assess tolerance for above shoulder functional lifting tasks and progress as indicated and tolerated by the patient.       Total Session Time   RETIRED Timed Code Treatment Minutes 25       RETIRED Total Treatment Time (sum of timed and untimed services) 25       Medicare Claim Information   Medical Diagnosis Shoulder injury, left, initial encounter (S49.92XA)       PT Diagnosis Left Shoulder Pain       Start of Care Date 02/21/23       Onset date of current  episode/exacerbation 10/20/22       RETIRED Therapeutic Procedure/exercise   RETIRED Treatment Detail Scapular alphabet with x1 pound dumbell using LUE shoulder above shoulder height; red theraband above shoulder wand scaption holds with DPT pertubations x30 seconds and while holding 2.2 pound medicine ball x30 seconds; 4 pound medicine ball waist to shoulder level lifts with BUE's 1x10; LUE shoulder scaption dumbell lifts above shoulder height 1x10; LUE above shoulder scaption cone lifts 1x15 with 3 pound wrist weight; LUE shoulder IR stretch with stick x10 repetitions of 3-5 second holds each.       RETIRED Progress Patient tolerated above shoulder scapular strengthening exercise during today's session without LUE shoulder pain exacerbation, reports muscle fatigue soreness. Patient demonstrates pain free above shoulder AROM with BUE shoulders in flexion and ABD planes WNL's during today's session.       Ortho Goal 1   Goal Identifier SPADI       Goal Description Patient will reduce his initial SPADI assessment score by 16% or greater to demonstrates reduced restriction of his left shoulder pain on performance of his upper extremity ADL's and work demands.       Goal Progress Patient scored 18.46 on his SPADI re-assessment during today's session.       Target Date 04/04/23       Ortho Goal 2   Goal Identifier Home Exercise Program       Goal Description Patient will demonstrate proper performance of and good adherence to his home exercise programs for 6 weeks to demonstrate improved independence with long term management of his left shoulder pain and functional deficit symptoms.       Goal Progress Patient reports good adherence to his HEP since his previous session without LUE shoulder pain exacerbation. Patient reports improvement of LUE shoulder function with work related lifting demands since his previous session.       Target Date 04/04/23       Ortho Goal 3   Goal Identifier Lifting       Goal Description  Patient will lift a 5 pound object above shoulder height with 2/10 or less left shoulder pain to demonstrate improved tolerance for repetitive upper extremity work lifting and overhead demands.       Goal Progress Patient tolerated BUE shoulder flexion waist to shoulder height lifts with 4 pound medicine ball for 1x10 during today's session.       Target Date 04/04/23       Session Number   Authorization status [facility]       RETIRED ORTHO GOALS   RETIRED PT Ortho Eval Goals 1;2;3       RETIRED Equipment Needs   RETIRED Equipment Needs Patient issued red and green therabands. Patient has access to broom for LUE shoulder IR stretch, as well as OREN dominguez LUE shoulder exercises in his HEP.         Thank you for your referral,  Francisco Villarreal PT, DPT    [facility]  O: [phone]  E: [email or web address]     Referring Provider:  Jack Starkey MD

## 2025-03-10 ENCOUNTER — PATIENT OUTREACH (OUTPATIENT)
Dept: CARE COORDINATION | Facility: CLINIC | Age: 55
End: 2025-03-10
Payer: COMMERCIAL

## 2025-05-05 ENCOUNTER — PATIENT OUTREACH (OUTPATIENT)
Dept: CARE COORDINATION | Facility: CLINIC | Age: 55
End: 2025-05-05
Payer: COMMERCIAL

## 2025-05-19 SDOH — HEALTH STABILITY: PHYSICAL HEALTH: ON AVERAGE, HOW MANY DAYS PER WEEK DO YOU ENGAGE IN MODERATE TO STRENUOUS EXERCISE (LIKE A BRISK WALK)?: 5 DAYS

## 2025-05-19 SDOH — HEALTH STABILITY: PHYSICAL HEALTH: ON AVERAGE, HOW MANY MINUTES DO YOU ENGAGE IN EXERCISE AT THIS LEVEL?: 60 MIN

## 2025-05-19 ASSESSMENT — SOCIAL DETERMINANTS OF HEALTH (SDOH): HOW OFTEN DO YOU GET TOGETHER WITH FRIENDS OR RELATIVES?: ONCE A WEEK

## 2025-05-20 ENCOUNTER — OFFICE VISIT (OUTPATIENT)
Dept: FAMILY MEDICINE | Facility: CLINIC | Age: 55
End: 2025-05-20
Payer: COMMERCIAL

## 2025-05-20 VITALS
HEART RATE: 60 BPM | SYSTOLIC BLOOD PRESSURE: 128 MMHG | OXYGEN SATURATION: 100 % | WEIGHT: 208 LBS | BODY MASS INDEX: 24.56 KG/M2 | HEIGHT: 77 IN | DIASTOLIC BLOOD PRESSURE: 76 MMHG | TEMPERATURE: 97.4 F | RESPIRATION RATE: 18 BRPM

## 2025-05-20 DIAGNOSIS — Z12.5 SCREENING FOR PROSTATE CANCER: ICD-10-CM

## 2025-05-20 DIAGNOSIS — E78.5 HYPERLIPIDEMIA LDL GOAL <130: ICD-10-CM

## 2025-05-20 DIAGNOSIS — I10 HYPERTENSION GOAL BP (BLOOD PRESSURE) < 140/90: ICD-10-CM

## 2025-05-20 DIAGNOSIS — Z00.00 ROUTINE GENERAL MEDICAL EXAMINATION AT A HEALTH CARE FACILITY: Primary | ICD-10-CM

## 2025-05-20 DIAGNOSIS — Z13.6 SCREENING FOR CARDIOVASCULAR CONDITION: ICD-10-CM

## 2025-05-20 DIAGNOSIS — M16.11 PRIMARY OSTEOARTHRITIS OF RIGHT HIP: ICD-10-CM

## 2025-05-20 DIAGNOSIS — Z80.42 FAMILY HX OF PROSTATE CANCER: ICD-10-CM

## 2025-05-20 DIAGNOSIS — Z87.442 HISTORY OF KIDNEY STONES: ICD-10-CM

## 2025-05-20 DIAGNOSIS — C43.9 MELANOMA OF SKIN (H): ICD-10-CM

## 2025-05-20 LAB
ANION GAP SERPL CALCULATED.3IONS-SCNC: 10 MMOL/L (ref 7–15)
BUN SERPL-MCNC: 18.9 MG/DL (ref 6–20)
CALCIUM SERPL-MCNC: 9.3 MG/DL (ref 8.8–10.4)
CHLORIDE SERPL-SCNC: 102 MMOL/L (ref 98–107)
CHOLEST SERPL-MCNC: 185 MG/DL
CREAT SERPL-MCNC: 1.08 MG/DL (ref 0.67–1.17)
EGFRCR SERPLBLD CKD-EPI 2021: 80 ML/MIN/1.73M2
FASTING STATUS PATIENT QL REPORTED: YES
FASTING STATUS PATIENT QL REPORTED: YES
GLUCOSE SERPL-MCNC: 98 MG/DL (ref 70–99)
HCO3 SERPL-SCNC: 26 MMOL/L (ref 22–29)
HDLC SERPL-MCNC: 72 MG/DL
LDLC SERPL CALC-MCNC: 77 MG/DL
NONHDLC SERPL-MCNC: 113 MG/DL
POTASSIUM SERPL-SCNC: 4.3 MMOL/L (ref 3.4–5.3)
PSA SERPL DL<=0.01 NG/ML-MCNC: 0.65 NG/ML (ref 0–3.5)
SODIUM SERPL-SCNC: 138 MMOL/L (ref 135–145)
TRIGL SERPL-MCNC: 180 MG/DL

## 2025-05-20 PROCEDURE — 3078F DIAST BP <80 MM HG: CPT | Performed by: STUDENT IN AN ORGANIZED HEALTH CARE EDUCATION/TRAINING PROGRAM

## 2025-05-20 PROCEDURE — G2211 COMPLEX E/M VISIT ADD ON: HCPCS | Performed by: STUDENT IN AN ORGANIZED HEALTH CARE EDUCATION/TRAINING PROGRAM

## 2025-05-20 PROCEDURE — 3074F SYST BP LT 130 MM HG: CPT | Performed by: STUDENT IN AN ORGANIZED HEALTH CARE EDUCATION/TRAINING PROGRAM

## 2025-05-20 PROCEDURE — 1126F AMNT PAIN NOTED NONE PRSNT: CPT | Performed by: STUDENT IN AN ORGANIZED HEALTH CARE EDUCATION/TRAINING PROGRAM

## 2025-05-20 PROCEDURE — 99213 OFFICE O/P EST LOW 20 MIN: CPT | Mod: 25 | Performed by: STUDENT IN AN ORGANIZED HEALTH CARE EDUCATION/TRAINING PROGRAM

## 2025-05-20 PROCEDURE — 99396 PREV VISIT EST AGE 40-64: CPT | Performed by: STUDENT IN AN ORGANIZED HEALTH CARE EDUCATION/TRAINING PROGRAM

## 2025-05-20 PROCEDURE — 36415 COLL VENOUS BLD VENIPUNCTURE: CPT | Performed by: STUDENT IN AN ORGANIZED HEALTH CARE EDUCATION/TRAINING PROGRAM

## 2025-05-20 PROCEDURE — 80048 BASIC METABOLIC PNL TOTAL CA: CPT | Performed by: STUDENT IN AN ORGANIZED HEALTH CARE EDUCATION/TRAINING PROGRAM

## 2025-05-20 PROCEDURE — G0103 PSA SCREENING: HCPCS | Performed by: STUDENT IN AN ORGANIZED HEALTH CARE EDUCATION/TRAINING PROGRAM

## 2025-05-20 PROCEDURE — 80061 LIPID PANEL: CPT | Performed by: STUDENT IN AN ORGANIZED HEALTH CARE EDUCATION/TRAINING PROGRAM

## 2025-05-20 RX ORDER — ATORVASTATIN CALCIUM 40 MG/1
TABLET, FILM COATED ORAL
Qty: 90 TABLET | Refills: 3 | Status: SHIPPED | OUTPATIENT
Start: 2025-05-20

## 2025-05-20 RX ORDER — AMLODIPINE BESYLATE 5 MG/1
TABLET ORAL
Qty: 90 TABLET | Refills: 3 | Status: SHIPPED | OUTPATIENT
Start: 2025-05-20

## 2025-05-20 ASSESSMENT — PAIN SCALES - GENERAL: PAINLEVEL_OUTOF10: NO PAIN (0)

## 2025-05-20 NOTE — PATIENT INSTRUCTIONS
Patient Education   Preventive Care Advice   This is general advice given by our system to help you stay healthy. However, your care team may have specific advice just for you. Please talk to your care team about your preventive care needs.  Nutrition  Eat 5 or more servings of fruits and vegetables each day.  Try wheat bread, brown rice and whole grain pasta (instead of white bread, rice, and pasta).  Get enough calcium and vitamin D. Check the label on foods and aim for 100% of the RDA (recommended daily allowance).  Lifestyle  Exercise at least 150 minutes each week  (30 minutes a day, 5 days a week).  Do muscle strengthening activities 2 days a week. These help control your weight and prevent disease.  No smoking.  Wear sunscreen to prevent skin cancer.  Have a dental exam and cleaning every 6 months.  Yearly exams  See your health care team every year to talk about:  Any changes in your health.  Any medicines your care team has prescribed.  Preventive care, family planning, and ways to prevent chronic diseases.  Shots (vaccines)   HPV shots (up to age 26), if you've never had them before.  Hepatitis B shots (up to age 59), if you've never had them before.  COVID-19 shot: Get this shot when it's due.  Flu shot: Get a flu shot every year.  Tetanus shot: Get a tetanus shot every 10 years.  Pneumococcal, hepatitis A, and RSV shots: Ask your care team if you need these based on your risk.  Shingles shot (for age 50 and up)  General health tests  Diabetes screening:  Starting at age 35, Get screened for diabetes at least every 3 years.  If you are younger than age 35, ask your care team if you should be screened for diabetes.  Cholesterol test: At age 39, start having a cholesterol test every 5 years, or more often if advised.  Bone density scan (DEXA): At age 50, ask your care team if you should have this scan for osteoporosis (brittle bones).  Hepatitis C: Get tested at least once in your life.  STIs (sexually  transmitted infections)  Before age 24: Ask your care team if you should be screened for STIs.  After age 24: Get screened for STIs if you're at risk. You are at risk for STIs (including HIV) if:  You are sexually active with more than one person.  You don't use condoms every time.  You or a partner was diagnosed with a sexually transmitted infection.  If you are at risk for HIV, ask about PrEP medicine to prevent HIV.  Get tested for HIV at least once in your life, whether you are at risk for HIV or not.  Cancer screening tests  Cervical cancer screening: If you have a cervix, begin getting regular cervical cancer screening tests starting at age 21.  Breast cancer scan (mammogram): If you've ever had breasts, begin having regular mammograms starting at age 40. This is a scan to check for breast cancer.  Colon cancer screening: It is important to start screening for colon cancer at age 45.  Have a colonoscopy test every 10 years (or more often if you're at risk) Or, ask your provider about stool tests like a FIT test every year or Cologuard test every 3 years.  To learn more about your testing options, visit:   .  For help making a decision, visit:   https://bit.ly/rn92254.  Prostate cancer screening test: If you have a prostate, ask your care team if a prostate cancer screening test (PSA) at age 55 is right for you.  Lung cancer screening: If you are a current or former smoker ages 50 to 80, ask your care team if ongoing lung cancer screenings are right for you.  For informational purposes only. Not to replace the advice of your health care provider. Copyright   2023 [employer] (formerly [employer]). All rights reserved. Clinically reviewed by the [facility] Transitions Program. [employer] [id number] - REV 01/24.

## 2025-05-20 NOTE — PROGRESS NOTES
Preventive Care Visit  [facility]  Jack Starkey MD, Family Medicine  May 20, 2025      Assessment & Plan   Problem List Items Addressed This Visit          Endocrine    Hyperlipidemia LDL goal <130    Relevant Medications    atorvastatin (LIPITOR) 40 MG tablet    Other Relevant Orders    Lipid panel reflex to direct LDL Non-fasting       Circulatory    Hypertension goal BP (blood pressure) < 140/90    Relevant Medications    atorvastatin (LIPITOR) 40 MG tablet    amLODIPine (NORVASC) 5 MG tablet    Other Relevant Orders    BASIC METABOLIC PANEL       Musculoskeletal and Integumentary    Primary osteoarthritis of right hip    Melanoma of skin (H)       Other    Family hx of prostate cancer    History of kidney stones     Other Visit Diagnoses         Routine general medical examination at a health care facility    -  Primary      Screening for cardiovascular condition          Screening for prostate cancer        Relevant Orders    PSA, screen           Age-appropriate screening and immunization reviewed.  Intermittent pain does not sound colicky in nature or consistent with stone but we did obtain BMP today.  No other blood or significant urinary symptoms that would need UA at this time but would recommend if recurrent or worsening.  We did review his ibuprofen use which is substantial and effects on function long-term.  Would recommend doing a trial of naproxen instead.  Follow-up with orthopedics to discuss injection.  He does see dermatology regularly.  Could consider monitoring blood pressures without amlodipine and seeing if these remain less than 130/90.  Otherwise continue the current dose.  Cardiovascular risk factors reviewed.    Patient has been advised of split billing requirements and indicates understanding: Yes    The longitudinal plan of care for the diagnosis(es)/condition(s) as documented were addressed during this visit. Due to the added complexity in care,  I will continue to support Cyrus in the subsequent management and with ongoing continuity of care.       Counseling  Appropriate preventive services were addressed with this patient via screening, questionnaire, or discussion as appropriate for fall prevention, nutrition, physical activity, Tobacco-use cessation, social engagement, weight loss and cognition.  Checklist reviewing preventive services available has been given to the patient.  Reviewed patient's diet, addressing concerns and/or questions.         Subjective   Cyrus is a 55 year old, presenting for the following:  Physical        5/20/2025     9:44 AM   Additional Questions   Roomed by Berenice DUMONT         5/20/2025     9:44 AM   Patient Reported Additional Medications   Patient reports taking the following new medications pepcid          HPI      Notes some flank pain rarely for some time. Hx of stones. He does do a lot of ibuprofen for the hip. He gets up twice at night without straining. No pain with urination. No blood.       Advance Care Planning    Discussed advance care planning with patient; informed AVS has link to [facility].        5/19/2025   General Health   How would you rate your overall physical health? Good   Feel stress (tense, anxious, or unable to sleep) Not at all         5/19/2025   Nutrition   Three or more servings of calcium each day? (!) NO   Diet: Regular (no restrictions)   How many servings of fruit and vegetables per day? (!) 2-3   How many sweetened beverages each day? 0-1         5/19/2025   Exercise   Days per week of moderate/strenous exercise 5 days   Average minutes spent exercising at this level 60 min         5/19/2025   Social Factors   Frequency of gathering with friends or relatives Once a week   Worry food won't last until get money to buy more No   Food not last or not have enough money for food? No   Do you have housing? (Housing is defined as stable permanent housing and does not include staying outside in a  car, in a tent, in an abandoned building, in an overnight shelter, or couch-surfing.) Yes   Are you worried about losing your housing? No   Lack of transportation? No   Unable to get utilities (heat,electricity)? No         5/19/2025   Fall Risk   Fallen 2 or more times in the past year? No   Trouble with walking or balance? No          5/19/2025   Dental   Dentist two times every year? Yes         Today's PHQ-2 Score:       5/19/2025    11:43 PM   PHQ-2 ( 1999 [employer])   Q1: Little interest or pleasure in doing things 0   Q2: Feeling down, depressed or hopeless 0   PHQ-2 Score 0    Q1: Little interest or pleasure in doing things Not at all   Q2: Feeling down, depressed or hopeless Not at all   PHQ-2 Score 0       Patient-reported           5/19/2025   Substance Use   Alcohol more than 3/day or more than 7/wk No   Do you use any other substances recreationally? No     Social History     Tobacco Use    Smoking status: Former    Smokeless tobacco: Former     Types: Chew     Quit date: 12/2/2021    Tobacco comments:     1 tin every 3 days   Vaping Use    Vaping status: Never Used   Substance Use Topics    Alcohol use: Yes     Comment: 2 a day     Drug use: No           5/19/2025   STI Screening   New sexual partner(s) since last STI/HIV test? No   Last PSA:   PSA   Date Value Ref Range Status   01/11/2021 0.77 0 - 4 ug/L Final     Comment:     Assay Method:  Chemiluminescence using Siemens Vista analyzer     Prostate Specific Antigen Screen   Date Value Ref Range Status   04/09/2024 0.74 0.00 - 3.50 ng/mL Final   03/25/2022 0.70 0.00 - 4.00 ug/L Final     ASCVD Risk   The 10-year ASCVD risk score (Mitchell HUI, et al., 2019) is: 5.3%    Values used to calculate the score:      Age: 55 years      Sex: Male      Is Non- : No      Diabetic: No      Tobacco smoker: No      Systolic Blood Pressure: 128 mmHg      Is BP treated: Yes      HDL Cholesterol: 60 mg/dL      Total Cholesterol: 158  "mg/dL    Fracture Risk Assessment Tool  Link to Frax Calculator  Use the information below to complete the Frax calculator  : 1970  Sex: male  Weight (kg): 94.3 kg (actual weight)  Height (cm): 195.6 cm  Previous Fragility Fracture:  No  History of parent with fractured hip:  No  Current Smoking:  No  Patient has been on glucocorticoids for more than 3 months (5mg/day or more): No  Rheumatoid Arthritis on Problem List:  No  Secondary Osteoporosis on Problem List:  No  Consumes 3 or more units of alcohol per day: No  Femoral Neck BMD (g/cm2)           Reviewed and updated as needed this visit by Provider                    Past Medical History:   Diagnosis Date    Hypertension     Melanoma of skin (H) 2023    Primary osteoarthritis of right hip 2022    Uncomplicated asthma     Unspecified asthma(493.90)     Asthma     Past Surgical History:   Procedure Laterality Date    BIOPSY      Moles    COLONOSCOPY N/A 12/10/2018    Procedure: COLONOSCOPY;  Surgeon: Zoran Montgomery MD;  Location:  GI    ESOPHAGOSCOPY, GASTROSCOPY, DUODENOSCOPY (EGD), COMBINED N/A 2022    Procedure: ESOPHAGOGASTRODUODENOSCOPY, WITH BIOPSY;  Surgeon: Vinicius Baker DO;  Location:  GI    HC VASECTOMY UNILAT/BILAT W POSTOP SEMEN  2000    Vasectomy    PHACOEMULSIFICATION WITH STANDARD INTRAOCULAR LENS IMPLANT Right 2019    Procedure: PHACOEMULSIFICATION WITH STANDARD INTRAOCULAR LENS IMPLANT RIGHT;  Surgeon: Loyd Tavarez MD;  Location: [facility]         Review of Systems  Constitutional, HEENT, cardiovascular, pulmonary, GI, , musculoskeletal, neuro, skin, endocrine and psych systems are negative, except as otherwise noted.     Objective    Exam  /76   Pulse 60   Temp 97.4  F (36.3  C) (Temporal)   Resp 18   Ht 1.956 m (6' 5\")   Wt 94.3 kg (208 lb)   SpO2 100%   BMI 24.67 kg/m     Estimated body mass index is 24.67 kg/m  as calculated from the following:    Height as of this " "encounter: 1.956 m (6' 5\").    Weight as of this encounter: 94.3 kg (208 lb).    Physical Exam  GENERAL: alert and no distress  EYES: Eyes grossly normal to inspection, PERRL and conjunctivae and sclerae normal  HENT: ear canals and TM's normal, nose and mouth without ulcers or lesions  NECK: no adenopathy, no asymmetry, masses, or scars  RESP: lungs clear to auscultation - no rales, rhonchi or wheezes  CV: regular rate and rhythm, normal S1 S2, no S3 or S4, no murmur, click or rub, no peripheral edema  ABDOMEN: soft, nontender, no hepatosplenomegaly, no masses and bowel sounds normal  MS: no gross musculoskeletal defects noted, no edema  SKIN: no suspicious lesions or rashes  NEURO: Normal strength and tone, mentation intact and speech normal  PSYCH: mentation appears normal, affect normal/bright        Signed Electronically by: Jack Starkey MD    "

## 2025-05-20 NOTE — NURSING NOTE
Prior to immunization administration, verified patients identity using patient s name and date of birth. Please see Immunization Activity for additional information.     Screening Questionnaire for Adult Immunization    Are you sick today?   No   Do you have allergies to medications, food, a vaccine component or latex?   No   Have you ever had a serious reaction after receiving a vaccination?   No   Do you have a long-term health problem with heart, lung, kidney, or metabolic disease (e.g., diabetes), asthma, a blood disorder, no spleen, complement component deficiency, a cochlear implant, or a spinal fluid leak?  Are you on long-term aspirin therapy?   No   Do you have cancer, leukemia, HIV/AIDS, or any other immune system problem?   No   Do you have a parent, brother, or sister with an immune system problem?   No   In the past 3 months, have you taken medications that affect  your immune system, such as prednisone, other steroids, or anticancer drugs; drugs for the treatment of rheumatoid arthritis, Crohn s disease, or psoriasis; or have you had radiation treatments?   No   Have you had a seizure, or a brain or other nervous system problem?   No   During the past year, have you received a transfusion of blood or blood    products, or been given immune (gamma) globulin or antiviral drug?   No   For women: Are you pregnant or is there a chance you could become       pregnant during the next month?   No   Have you received any vaccinations in the past 4 weeks?   No     Immunization questionnaire answers were all negative.      Patient instructed to remain in clinic for 15 minutes afterwards, and to report any adverse reactions.     Screening performed by Berenice Leon LPN on 5/20/2025 at 9:48 AM.

## 2025-05-21 ENCOUNTER — RESULTS FOLLOW-UP (OUTPATIENT)
Dept: FAMILY MEDICINE | Facility: CLINIC | Age: 55
End: 2025-05-21

## (undated) DEVICE — GLOVE PROTEXIS W/NEU-THERA 7.5  2D73TE75

## (undated) DEVICE — NDL 19GA 1.5" FILTER 305200

## (undated) DEVICE — LUBRICATING JELLY 4.25OZ

## (undated) DEVICE — TUBING SUCTION 12"X1/4" N612

## (undated) DEVICE — KIT ENDO TURNOVER/PROCEDURE CARRY-ON 101822

## (undated) DEVICE — NDL ECLIPSE 18GA 1.5"

## (undated) RX ORDER — KETAMINE HCL IN 0.9 % NACL 20 MG/2 ML
SYRINGE (ML) INTRAVENOUS
Status: DISPENSED
Start: 2019-01-31